# Patient Record
Sex: MALE | Race: WHITE | Employment: OTHER | URBAN - NONMETROPOLITAN AREA
[De-identification: names, ages, dates, MRNs, and addresses within clinical notes are randomized per-mention and may not be internally consistent; named-entity substitution may affect disease eponyms.]

---

## 2018-04-20 ENCOUNTER — ONCOLOGY VISIT (OUTPATIENT)
Dept: RADIATION ONCOLOGY | Facility: HOSPITAL | Age: 73
End: 2018-04-20
Attending: RADIOLOGY
Payer: MEDICARE

## 2018-04-20 VITALS
DIASTOLIC BLOOD PRESSURE: 82 MMHG | HEIGHT: 68 IN | BODY MASS INDEX: 31.37 KG/M2 | SYSTOLIC BLOOD PRESSURE: 176 MMHG | RESPIRATION RATE: 16 BRPM | WEIGHT: 207 LBS | HEART RATE: 52 BPM

## 2018-04-20 DIAGNOSIS — C61 PROSTATE CANCER (H): ICD-10-CM

## 2018-04-20 DIAGNOSIS — C61 MALIGNANT NEOPLASM OF PROSTATE (H): Primary | ICD-10-CM

## 2018-04-20 PROCEDURE — G0463 HOSPITAL OUTPT CLINIC VISIT: HCPCS | Performed by: RADIOLOGY

## 2018-04-20 RX ORDER — ATORVASTATIN CALCIUM 20 MG/1
20 TABLET, FILM COATED ORAL DAILY
COMMUNITY
Start: 2017-08-18 | End: 2020-10-26

## 2018-04-20 RX ORDER — METOPROLOL SUCCINATE 25 MG/1
25 TABLET, EXTENDED RELEASE ORAL DAILY
COMMUNITY
Start: 2017-08-18

## 2018-04-20 RX ORDER — AMOXICILLIN 500 MG
1200 CAPSULE ORAL DAILY
COMMUNITY
Start: 2013-03-05

## 2018-04-20 RX ORDER — ASPIRIN 325 MG
325 TABLET ORAL DAILY
COMMUNITY
Start: 2010-08-12

## 2018-04-20 ASSESSMENT — PAIN SCALES - GENERAL: PAINLEVEL: NO PAIN (0)

## 2018-04-20 NOTE — PATIENT INSTRUCTIONS
One week before your simulation begin avoiding food that you know causes you gas.    Stay well hydrated throughout treatment.    Two days before your sim start taking Gas-x (Simethicone 125 mg) three times per day, after meals.  Start on Saturday 4/28/18     Continue taking Gas-X every day until you are finished with your radiation treatments. (Including weekends).    Do your best to have a bowel movement before every appointment    On the day of your simulation:      Do not eat or drink anything for 2 hours before your appointment.  Stop eating/drinking at 11:15 am.      Empty bladder and drink 12 ounces of water at 12:30 pm.  DO NOT empty bladder again until after your appointment.    Continue this throughout treatment.

## 2018-04-20 NOTE — CONSULTS
Consult Date:  04/20/2018      DIAGNOSIS:  Prostate carcinoma, Dorys 3+3, however, with marked acceleration in PSA history which is currently 9.95.        HISTORY:  The patient has had his PSA followed for several years prior to urologic evaluation.  It had remained very low, less than 1 from 2489-0000, taking approximately 5-6 years to rise up to 2.77.  It then almost doubled in 2 years and now, while it still has a doubling interval of approximately 4-5 years, appears to be going up exponentially or what is called an increasing velocity.  He has been, again, on active surveillance with a score of Dorys 3+3, however, is not a gentleman who is terribly skeptical of treatment or wants to avoid treatment.      PAST MEDICAL HISTORY:  Some coronary history having had triple bypass, although with excellent recovery and outcome.  He had a left knee osteotomy, of course many years ago, 1980, which has worked out extremely well for him, avoiding total knee arthroplasty.  He has had arthroscopic surgery of his knees, right cataract surgery.      INTERCURRENT MEDICAL ILLNESSES:  Dyslipidemia, history of coronary artery disease with a good surgical outcome, hypertension, and history of nonmelanoma skin carcinoma.  He is not diabetic.  No significant history other than cardiac of any vascular disease, specifically no CNS vascular disease or claudication.      ALLERGIES:  LISINOPRIL.      REVIEW OF SYSTEMS:  No diplopia, headaches, dizziness, loss of consciousness.  No swallowing difficulties, odynophagia, or dysphagia.  No thermal or temperature intolerance.  No nausea, vomiting, heartburn.  No orthopnea, dyspnea, palpitations.  No abdominal complaints, constipation, bloating, diarrhea.  No fevers, chills, night sweats.  Urinary tract function is excellent.  Rare nocturia.        DIET:  General.        WEIGHT:  Stable.      FAMILY HISTORY:  Maternal grandfather had thyroid carcinoma.      HABITS:  Alcohol use, minimal.   Tobacco, distant history.  Very little tobacco use at all.      SOCIAL AND DEMOGRAPHIC:  The patient is single and has three adult offspring.  He is a retired , lives at home with his granddaughter.      PHYSICAL EXAMINATION:   GENERAL:  Reveals a cooperative, healthy-appearing male.   VITAL SIGNS:  Weight is 207.8 pounds, blood pressure 176/82, heart rate 52.   HEENT:  Extraocular movements full.  Pupils are equal, round, reactive.  Face, symmetric.  Palate and tongue, midline and symmetric.   NECK:  No cervical or supraclavicular lymphadenopathy.   LUNGS:  Clear to auscultation.   HEART:  Cardiac exam reveals regular rate and rhythm without murmurs or extra sounds.   CHEST:  No axillary lymphadenopathy.   ABDOMEN:  Nontender, without appreciable organomegaly.  No inguinal lymphadenopathy.  No axial skeletal tenderness.   EXTREMITIES:  Extremity strength is intact and symmetric and deep tendon reflexes as well.     IMAGING:  A report of a negative bone scan is on the chart and I do not believe he has had any other imaging which, of course, is appropriate.      IMPRESSION:  Low-grade Dorys 3+3 carcinoma by history; however, I think his PSA dynamics are concerning, particularly if one looks at a graph of the PSA it appears to be a very brisk upward slope.  For this reason, I think it would be very reasonable to offer him definitive treatment.  We had a full discussion about this, that the primary concern with prostate carcinoma is local control.  Certainly, his distant metastatic risk I think is quite low.  I described a course of definitive radiation, approximately 40 treatments in all, with side effects such as bladder or rectal irritation, increased urinary frequency, symptoms of cystitis, proctitis, diarrhea, rare late complications, ulceration or fistula of the bladder or rectum resulting in loss of these organs, probably 2% or less.  The patient understands very well the issues, accepts the  risks and complications, and does desire treatment.        PLAN:  With that in mind we went ahead and scheduled treatment simulation and planning, planning CT and also MRI scan to help delineate anatomy.  He was also given bladder and bowel instructions as well.         NOEL TAPIA MD             D: 2018   T: 2018   MT: NTS      Name:     ROLDAN MARQUES   MRN:      [id number]        Account:       [id number]   :      1945           Consult Date:  2018      Document: [id number]       cc: Sabina Matthews DO       Noel Tapia MD

## 2018-04-20 NOTE — PROGRESS NOTES
"INITIAL PATIENT ASSESSMENT    Referring Physician: Cassie  Other Physicians: Sabina Flanagan NP    Diagnosis: prostate cancer       Chief Complaint   Patient presents with     Radiation Therapy       Initial /82 (BP Location: Right arm, Patient Position: Chair, Cuff Size: Adult Regular)  Pulse 52  Resp 16  Ht 1.727 m (5' 8\")  Wt 93.9 kg (207 lb)  BMI 31.47 kg/m2 Estimated body mass index is 31.47 kg/(m^2) as calculated from the following:    Height as of this encounter: 1.727 m (5' 8\").    Weight as of this encounter: 93.9 kg (207 lb).  Medication Reconciliation: complete      Prior radiation therapy: None    Prior chemotherapy: None    Prior hormonal therapy:No    Pain Eval:  Denies    Psychosocial  Marital Status:  single  Spouse/Significant other: NA   Children: 3   Occupation:     Retired: Yes  Living arrangements: lives with granddaughter  Do you feel safe at home? Yes  Activity status: independent   referral needs: Not needed    Advanced Directive: No    Patient was assessed for the influenza, pneumo-poly, prevnar 13, Tdap, and shingles immunizations. \"Vaccinations and Cancer Treatment\" flyer given.  Instructed patient to discuss vaccination status with his/her PCM.     Pt is here today for a consult for radiation therapy for prostate cancer.  Educated patient on the mapping process and the possible side effects of XRT to the pelvis, to include: skin irritation, urinary changes, diarrhea, and fatigue.  Given folder with written information and prostate simulation instructions. Pt verbalizes an understanding and has no questions at this time. Pt is accompanied by son today.      Lesley Siddiqui RN          "

## 2018-04-20 NOTE — MR AVS SNAPSHOT
After Visit Summary   4/20/2018    Steven Lopez    MRN: [id number]           Patient Information     Date Of Birth          1945        Visit Information        Provider Department      4/20/2018 10:00 AM Noel Ballesteros MD [facility]        Today's Diagnoses     Malignant neoplasm of prostate (H)    -  1    Prostate cancer (H)          Care Instructions      One week before your simulation begin avoiding food that you know causes you gas.    Stay well hydrated throughout treatment.    Two days before your sim start taking Gas-x (Simethicone 125 mg) three times per day, after meals.  Start on Saturday 4/28/18     Continue taking Gas-X every day until you are finished with your radiation treatments. (Including weekends).    Do your best to have a bowel movement before every appointment    On the day of your simulation:      Do not eat or drink anything for 2 hours before your appointment.  Stop eating/drinking at 11:15 am.      Empty bladder and drink 12 ounces of water at 12:30 pm.  DO NOT empty bladder again until after your appointment.    Continue this throughout treatment.              Follow-ups after your visit        Your next 10 appointments already scheduled     Apr 30, 2018  1:15 PM CDT   Simulation with HI SIMULATION   [facility] ([facility] )    [address] [address] [phone]            Apr 30, 2018  2:15 PM CDT   (Arrive by 2:00 PM)   MR PELVIS W/O CONTRAST with [address] MRI ([facility] )    [address] [address] [phone]           Take your medicines as usual, unless your doctor tells you not to. Bring a list of your current medicines to your exam (including vitamins, minerals and over-the-counter drugs). Also bring the results of similar scans you may have had.  Please remove any body piercings and hair extensions before you arrive.  Follow your doctor s orders. If you do  not, we may have to postpone your exam.  You may or may not receive IV contrast for this exam pending the discretion of the Radiologist.  You do not need to do anything special to prepare.  The MRI machine uses a strong magnet. Please wear clothes without metal (snaps, zippers). A sweatsuit works well, or we may give you a hospital gown.   **IMPORTANT** THE INSTRUCTIONS BELOW ARE ONLY FOR THOSE PATIENTS WHO HAVE BEEN PRESCRIBED SEDATION OR GENERAL ANESTHESIA DURING THEIR MRI PROCEDURE:  IF YOUR DOCTOR PRESCRIBED ORAL SEDATION (take medicine to help you relax during your exam):   You must get the medicine from your doctor (oral medication) before you arrive. Bring the medicine to the exam. Do not take it at home. You ll be told when to take it upon arriving for your exam.   Arrive one hour early. Bring someone who can take you home after the test. Your medicine will make you sleepy. After the exam, you may not drive, take a bus or take a taxi by yourself.  IF YOUR DOCTOR PRESCRIBED IV SEDATION:   Arrive one hour early. Bring someone who can take you home after the test. Your medicine will make you sleepy. After the exam, you may not drive, take a bus or take a taxi by yourself.   No eating 6 hours before your exam. You may have clear liquids up until 4 hours before your exam. (Clear liquids include water, clear tea, black coffee and fruit juice without pulp.)  IF YOUR DOCTOR PRESCRIBED ANESTHESIA (be asleep for your exam):   Arrive 1 1/2 hours early. Bring someone who can take you home after the test. You may not drive, take a bus or take a taxi by yourself.   No eating 8 hours before your exam. You may have clear liquids up until 4 hours before your exam. (Clear liquids include water, clear tea, black coffee and fruit juice without pulp.)   You will spend four to five hours in the recovery room.  Please call the Imaging Department at your exam site with any questions.              Future tests that were ordered for  "you today     Open Future Orders        Priority Expected Expires Ordered    MR Pelvis w/o Contrast Routine  2019            Who to contact     If you have questions or need follow up information about today's clinic visit or your schedule please contact [facility] directly at [phone].  Normal or non-critical lab and imaging results will be communicated to you by MyChart, letter or phone within 4 business days after the clinic has received the results. If you do not hear from us within 7 days, please contact the clinic through [employer] or phone. If you have a critical or abnormal lab result, we will notify you by phone as soon as possible.  Submit refill requests through [employer] or call your pharmacy and they will forward the refill request to us. Please allow 3 business days for your refill to be completed.          Additional Information About Your Visit        MyChart Information     [employer] lets you send messages to your doctor, view your test results, renew your prescriptions, schedule appointments and more. To sign up, go to [email or web address] . Click on \"Log in\" on the left side of the screen, which will take you to the Welcome page. Then click on \"Sign up Now\" on the right side of the page.     You will be asked to enter the access code listed below, as well as some personal information. Please follow the directions to create your username and password.     Your access code is: [email or web address]  Expires: 2018  2:55 PM     Your access code will  in 90 days. If you need help or a new code, please call your [place] clinic or [phone].        Care EveryWhere ID     This is your Care EveryWhere ID. This could be used by other organizations to access your [place] medical records  [id number]        Your Vitals Were     Pulse Respirations Height BMI (Body Mass Index)          52 16 1.727 m (5' 8\") 31.47 kg/m2         Blood Pressure from Last 3 Encounters:   18 " 176/82    Weight from Last 3 Encounters:   04/20/18 93.9 kg (207 lb)              We Performed the Following     Full Code        Primary Care Provider Office Phone # Fax #    Sabina MachadoKENDAL yung [phone]       [place] [address] [address] [facility] [address]        Equal Access to Services     [facility]: Hadii aad ku hadasho Soomaali, waaxda luqadaha, qaybta kaalmada adeegyada, waxay idiin hayaan adeeg khtonysh la'aan . So [facility] [phone].    ATENCIÓN: Si habla español, tiene a bergman disposición servicios gratuitos de asistencia lingüística. [facility] [phone].    We comply with applicable federal civil rights laws and [place] laws. We do not discriminate on the basis of race, color, national origin, age, disability, sex, sexual orientation, or gender identity.            Thank you!     Thank you for choosing [facility]  for your care. Our goal is always to provide you with excellent care. Hearing back from our patients is one way we can continue to improve our services. Please take a few minutes to complete the written survey that you may receive in the mail after your visit with us. Thank you!             Your Updated Medication List - Protect others around you: Learn how to safely use, store and throw away your medicines at www.disposemymeds.org.          This list is accurate as of 4/20/18  2:55 PM.  Always use your most recent med list.                   Brand Name Dispense Instructions for use Diagnosis    aspirin 325 MG tablet      Take 325 mg by mouth daily        atorvastatin 20 MG tablet    LIPITOR     Take 20 mg by mouth daily        fish Oil 1200 MG capsule      Take 1,200 mg by mouth daily        metoprolol succinate 25 MG 24 hr tablet    TOPROL-XL     Take 25 mg by mouth daily

## 2018-04-21 ASSESSMENT — PATIENT HEALTH QUESTIONNAIRE - PHQ9: SUM OF ALL RESPONSES TO PHQ QUESTIONS 1-9: 1

## 2018-04-30 ENCOUNTER — HOSPITAL ENCOUNTER (OUTPATIENT)
Dept: MRI IMAGING | Facility: HOSPITAL | Age: 73
Setting detail: RADIATION/ONCOLOGY SERIES
End: 2018-04-30
Attending: RADIOLOGY
Payer: MEDICARE

## 2018-04-30 ENCOUNTER — ALLIED HEALTH/NURSE VISIT (OUTPATIENT)
Dept: RADIATION ONCOLOGY | Facility: HOSPITAL | Age: 73
End: 2018-04-30
Attending: RADIOLOGY
Payer: MEDICARE

## 2018-04-30 DIAGNOSIS — C61 MALIGNANT NEOPLASM OF PROSTATE (H): Primary | ICD-10-CM

## 2018-04-30 DIAGNOSIS — C61 MALIGNANT NEOPLASM OF PROSTATE (H): ICD-10-CM

## 2018-04-30 PROCEDURE — 72195 MRI PELVIS W/O DYE: CPT | Mod: TC

## 2018-04-30 PROCEDURE — 77334 RADIATION TREATMENT AID(S): CPT | Performed by: RADIOLOGY

## 2018-04-30 NOTE — MR AVS SNAPSHOT
After Visit Summary   4/30/2018    Steven Lopez    MRN: [id number]           Patient Information     Date Of Birth          1945        Visit Information        Provider Department      4/30/2018 1:15 PM HI SIMULATION [facility]        Today's Diagnoses     Malignant neoplasm of prostate (H)    -  1       Follow-ups after your visit        Your next 10 appointments already scheduled     Apr 30, 2018  2:15 PM CDT   (Arrive by 2:00 PM)   MR PELVIS W/O CONTRAST with HIMR1   HI MRI ([facility] )    [address] [address] [phone]           Take your medicines as usual, unless your doctor tells you not to. Bring a list of your current medicines to your exam (including vitamins, minerals and over-the-counter drugs). Also bring the results of similar scans you may have had.  Please remove any body piercings and hair extensions before you arrive.  Follow your doctor s orders. If you do not, we may have to postpone your exam.  You may or may not receive IV contrast for this exam pending the discretion of the Radiologist.  You do not need to do anything special to prepare.  The MRI machine uses a strong magnet. Please wear clothes without metal (snaps, zippers). A sweatsuit works well, or we may give you a hospital gown.   **IMPORTANT** THE INSTRUCTIONS BELOW ARE ONLY FOR THOSE PATIENTS WHO HAVE BEEN PRESCRIBED SEDATION OR GENERAL ANESTHESIA DURING THEIR MRI PROCEDURE:  IF YOUR DOCTOR PRESCRIBED ORAL SEDATION (take medicine to help you relax during your exam):   You must get the medicine from your doctor (oral medication) before you arrive. Bring the medicine to the exam. Do not take it at home. You ll be told when to take it upon arriving for your exam.   Arrive one hour early. Bring someone who can take you home after the test. Your medicine will make you sleepy. After the exam, you may not drive, take a bus or take a taxi by yourself.  IF YOUR DOCTOR  PRESCRIBED IV SEDATION:   Arrive one hour early. Bring someone who can take you home after the test. Your medicine will make you sleepy. After the exam, you may not drive, take a bus or take a taxi by yourself.   No eating 6 hours before your exam. You may have clear liquids up until 4 hours before your exam. (Clear liquids include water, clear tea, black coffee and fruit juice without pulp.)  IF YOUR DOCTOR PRESCRIBED ANESTHESIA (be asleep for your exam):   Arrive 1 1/2 hours early. Bring someone who can take you home after the test. You may not drive, take a bus or take a taxi by yourself.   No eating 8 hours before your exam. You may have clear liquids up until 4 hours before your exam. (Clear liquids include water, clear tea, black coffee and fruit juice without pulp.)   You will spend four to five hours in the recovery room.  Please call the Imaging Department at your exam site with any questions.            May 09, 2018  2:45 PM CDT   Treatment with HI CLINAC IX   [facility] ([facility] )    [address] [address]   [phone]            May 10, 2018  1:15 PM CDT   Treatment with HI CLINAC IX   [facility] ([facility] )    [address] [address]   [phone]            May 11, 2018  1:15 PM CDT   Treatment with HI CLINAC IX   [facility] ([facility] )    [address] [address]   [phone]            May 14, 2018  1:15 PM CDT   Treatment with HI CLINAC IX   [facility] ([facility] )    [address] [address]   [phone]            May 15, 2018  1:15 PM CDT   Treatment with HI CLINAC IX   [facility] ([facility] )    [address] [address]   [phone]            May 16, 2018  1:15 PM CDT   Treatment with HI CLINAC IX   [facility] ([facility] )    [address]  "[facility] [id number] [phone]            May 17, 2018  1:15 PM CDT   Treatment with HI CLINAC IX   [facility] Radiation [facility]cology ([facility] )    [address] [address]   [phone]            May 18, 2018  1:15 PM CDT   Treatment with HI CLINAC IX   [facility] ([facility] )    [address] [id number] [phone]            May 21, 2018  1:15 PM CDT   Treatment with HI CLINAC IX   [facility] ([facility] )    [address] [address]   [phone]              Who to contact     If you have questions or need follow up information about today's clinic visit or your schedule please contact [facility] directly at [phone].  Normal or non-critical lab and imaging results will be communicated to you by [employer], letter or phone within 4 business days after the clinic has received the results. If you do not hear from us within 7 days, please contact the clinic through [employer] or phone. If you have a critical or abnormal lab result, we will notify you by phone as soon as possible.  Submit refill requests through [employer] or call your pharmacy and they will forward the refill request to us. Please allow 3 business days for your refill to be completed.          Additional Information About Your Visit        [employer] Information     [employer] lets you send messages to your doctor, view your test results, renew your prescriptions, schedule appointments and more. To sign up, go to [email or web address] . Click on \"Log in\" on the left side of the screen, which will take you to the Welcome page. Then click on \"Sign up Now\" on the right side of the page.     You will be asked to enter the access code listed below, as well as some personal information. Please follow the directions to create your username and password.     Your access code is: [email or web address]  Expires: 7/19/2018  2:55 PM     Your " access code will  in 90 days. If you need help or a new code, please call your [place] clinic or [phone].        Care EveryWhere ID     This is your Care EveryWhere ID. This could be used by other organizations to access your [place] medical records  [id number]         Blood Pressure from Last 3 Encounters:   18 176/82    Weight from Last 3 Encounters:   18 93.9 kg (207 lb)              Today, you had the following     No orders found for display       Primary Care Provider Office Phone # Fax #    Sabina Flaangan, [phone]       [place] [address] [address]  [facility] [address]        Equal Access to Services     [facility] : Hadii charlie welsho Soomaali, waaxda luqadaha, qaybta kaalmada adeegyada, steven victoria . So [facility] [place] [phone].    ATENCIÓN: Si habla español, tiene a bergman disposición servicios gratuitos de asistencia lingüística. Llame al [phone].    We comply with applicable federal civil rights laws and [place] laws. We do not discriminate on the basis of race, color, national origin, age, disability, sex, sexual orientation, or gender identity.            Thank you!     Thank you for choosing [facility]  for your care. Our goal is always to provide you with excellent care. Hearing back from our patients is one way we can continue to improve our services. Please take a few minutes to complete the written survey that you may receive in the mail after your visit with us. Thank you!             Your Updated Medication List - Protect others around you: Learn how to safely use, store and throw away your medicines at www.disposemymeds.org.          This list is accurate as of 18  1:52 PM.  Always use your most recent med list.                   Brand Name Dispense Instructions for use Diagnosis    aspirin 325 MG tablet      Take 325 mg by mouth daily        atorvastatin 20 MG tablet    LIPITOR     Take  20 mg by mouth daily        fish Oil 1200 MG capsule      Take 1,200 mg by mouth daily        metoprolol succinate 25 MG 24 hr tablet    TOPROL-XL     Take 25 mg by mouth daily

## 2018-05-08 PROCEDURE — 77338 DESIGN MLC DEVICE FOR IMRT: CPT | Performed by: RADIOLOGY

## 2018-05-08 PROCEDURE — 77300 RADIATION THERAPY DOSE PLAN: CPT | Performed by: RADIOLOGY

## 2018-05-09 ENCOUNTER — ALLIED HEALTH/NURSE VISIT (OUTPATIENT)
Dept: RADIATION ONCOLOGY | Facility: HOSPITAL | Age: 73
End: 2018-05-09
Attending: RADIOLOGY
Payer: MEDICARE

## 2018-05-09 DIAGNOSIS — C61 MALIGNANT NEOPLASM OF PROSTATE (H): Primary | ICD-10-CM

## 2018-05-09 PROCEDURE — 77385 ZZH IMRT TREATMENT DELIVERY, SIMPLE: CPT | Performed by: RADIOLOGY

## 2018-05-09 PROCEDURE — 77301 RADIOTHERAPY DOSE PLAN IMRT: CPT | Performed by: RADIOLOGY

## 2018-05-09 NOTE — MR AVS SNAPSHOT
After Visit Summary   5/9/2018    Steven Lopez    MRN: [id number]           Patient Information     Date Of Birth          1945        Visit Information        Provider Department      5/9/2018 2:45 PM [facility]        Today's Diagnoses     Malignant neoplasm of prostate (H)    -  1       Follow-ups after your visit        Your next 10 appointments already scheduled     May 10, 2018  1:15 PM CDT   Treatment with [facility]   [facility] Rad[facility] Oncology ([facility] )    [address] [id number] [phone]            May 11, 2018  1:15 PM CDT   Treatment with [facility]   [facility] Rad[facility]cology ([facility] )    [address] [id number] [phone]            May 14, 2018  1:15 PM CDT   Treatment with [facility]   [facility] Rad[facility] Oncology ([facility] )    [address] [id number] [phone]            May 15, 2018  1:15 PM CDT   Treatment with [facility]   [facility] Rad[facility] Oncology ([facility] )    [address] [phone] [phone]            May 16, 2018  1:15 PM CDT   Treatment with [facility]   [facility] Radiation Oncology ([facility] )    [address] [address] [phone]            May 17, 2018  1:15 PM CDT   Treatment with [facility]   [facility] Radiation Oncology ([facility] )    [address] [address] [phone]            May 18, 2018  1:15 PM CDT   Treatment with [facility]   [facility] Radiation Oncology ([facility] )    [address] [id number] [phone]            May 21, 2018  1:15 PM CDT   Treatment with [facility]   [facility] Radiation Oncology ([facility] )    [address] [phone]            May 22, 2018  1:15 PM CDT   Treatment with [facility]   HI Radiation Oncology ([facility] )    [address] [address] [phone]            May 23, 2018  1:15 PM CDT   Treatment with [facility]   [facility] Radiation Oncology ([facility] )    [address] [address] [phone]              Who to contact     If you have questions or need follow up information about today's clinic visit or your schedule please contact [facility] directly at [phone].  Normal or non-critical lab and imaging results will be communicated to you by MyChart, letter or phone within 4 business days after the clinic has received the results. If you do not hear from us within 7 days, please contact the clinic through [employer] or phone. If you have a critical or abnormal lab result, we will notify you by phone as soon as possible.  Submit refill requests through [employer] or call your pharmacy and they will forward the refill request to us. Please allow 3 business days for your refill to be completed.          Additional Information About Your Visit        [employer] Information     [employer] lets you send messages to your doctor, view your test results, renew your prescriptions, schedule appointments and more. To sign up, go to [email or web address] . Click on \"Log in\" on the left side of the screen, which will take you to the Welcome page. Then click on \"Sign up Now\" on the right side of the page.     You will be asked to enter the access code listed below, as well as some personal information. Please follow the directions to create your username and password.     Your access code is: [email or web address]  Expires: 2018  2:55 PM     Your access code will  in 90 days. If you need help or a new code, please call your [place] clinic or [phone].        Care EveryWhere ID     This is your Care EveryWhere ID. This could be used by other organizations to access your [place] medical records  [id number]         Blood Pressure from Last 3 Encounters: "   04/20/18 176/82    Weight from Last 3 Encounters:   04/20/18 93.9 kg (207 lb)              Today, you had the following     No orders found for display       Primary Care Provider Office Phone # Fax #    Sabina KENDAL Flanagan [phone]       [place] [address] ARTI WALSH [facility] [address]        Equal Access to Services     [facility]: Hadii aad ku hadasho Soomaali, waaxda luqadaha, qaybta kaalmada adeegyada, waxay idiin hayaan adeeg khtonysh lajimn . So [facility] [phone].    ATENCIÓN: Si habla español, tiene a bergman disposición servicios gratuitos de asistencia lingüística. Audreyame al [phone].    We comply with applicable federal civil rights laws and [place] laws. We do not discriminate on the basis of race, color, national origin, age, disability, sex, sexual orientation, or gender identity.            Thank you!     Thank you for choosing [facility]  for your care. Our goal is always to provide you with excellent care. Hearing back from our patients is one way we can continue to improve our services. Please take a few minutes to complete the written survey that you may receive in the mail after your visit with us. Thank you!             Your Updated Medication List - Protect others around you: Learn how to safely use, store and throw away your medicines at www.disposemymeds.org.          This list is accurate as of 5/9/18  3:10 PM.  Always use your most recent med list.                   Brand Name Dispense Instructions for use Diagnosis    aspirin 325 MG tablet      Take 325 mg by mouth daily        atorvastatin 20 MG tablet    LIPITOR     Take 20 mg by mouth daily        fish Oil 1200 MG capsule      Take 1,200 mg by mouth daily        metoprolol succinate 25 MG 24 hr tablet    TOPROL-XL     Take 25 mg by mouth daily

## 2018-05-09 NOTE — PROGRESS NOTES
Steven Lopez received radiation therapy treatment today 05/09/18.    Kelly Pantoja  May 9, 2018  2:36 PM

## 2018-05-10 ENCOUNTER — ALLIED HEALTH/NURSE VISIT (OUTPATIENT)
Dept: RADIATION ONCOLOGY | Facility: HOSPITAL | Age: 73
End: 2018-05-10

## 2018-05-10 DIAGNOSIS — C61 MALIGNANT NEOPLASM OF PROSTATE (H): Primary | ICD-10-CM

## 2018-05-10 PROCEDURE — 77385 ZZH IMRT TREATMENT DELIVERY, SIMPLE: CPT | Performed by: RADIOLOGY

## 2018-05-10 NOTE — MR AVS SNAPSHOT
After Visit Summary   5/10/2018    Steven Lopez    MRN: [id number]           Patient Information     Date Of Birth          1945        Visit Information        Provider Department      5/10/2018 1:15 PM [facility]        Today's Diagnoses     Malignant neoplasm of prostate (H)    -  1       Follow-ups after your visit        Your next 10 appointments already scheduled     May 11, 2018  1:15 PM CDT   Treatment with [facility]   [facility] Radiation Oncology ([facility] )    [address] [id number] [phone]            May 14, 2018  1:15 PM CDT   Treatment with [facility]   [facility] Radiation Oncology ([facility] )    [address] [address] [phone]            May 15, 2018  1:15 PM CDT   Treatment with [facility]   [facility] Radiation Oncology ([facility] )    [address] [id number] [phone]            May 16, 2018  1:15 PM CDT   Treatment with [facility]   [facility] Radiation Oncology ([facility] )    [address] [address] [phone]            May 17, 2018  1:15 PM CDT   Treatment with [facility]   [facility] Radiation Oncology ([facility] )    [address] [address] [phone]            May 18, 2018  1:15 PM CDT   Treatment with [facility]   [facility] Radiation Oncology ([facility] )    [address] [address] [phone]            May 21, 2018  1:15 PM CDT   Treatment with [facility]   [facility] Radiation Oncology ([facility] )    [address] [id number] [phone]            May 22, 2018  1:15 PM CDT   Treatment with [facility]   [facility] Radiation Oncology ([facility] )    [address] [address] [phone]            May 23, 2018  1:15 PM CDT   Treatment with [facility]   HI Radiation Oncology ([facility]  "[facility] )    [address] [address] [phone]            May 24, 2018  1:15 PM CDT   Treatment with [facility]   HI Radiation Oncology ([facility] )    [address] [address] [phone]              Who to contact     If you have questions or need follow up information about today's clinic visit or your schedule please contact [facility] directly at [phone].  Normal or non-critical lab and imaging results will be communicated to you by MyChart, letter or phone within 4 business days after the clinic has received the results. If you do not hear from us within 7 days, please contact the clinic through [employer] or phone. If you have a critical or abnormal lab result, we will notify you by phone as soon as possible.  Submit refill requests through [employer] or call your pharmacy and they will forward the refill request to us. Please allow 3 business days for your refill to be completed.          Additional Information About Your Visit        [employer] Information     [employer] lets you send messages to your doctor, view your test results, renew your prescriptions, schedule appointments and more. To sign up, go to [email or web address] . Click on \"Log in\" on the left side of the screen, which will take you to the Welcome page. Then click on \"Sign up Now\" on the right side of the page.     You will be asked to enter the access code listed below, as well as some personal information. Please follow the directions to create your username and password.     Your access code is: [email or web address]  Expires: 2018  2:55 PM     Your access code will  in 90 days. If you need help or a new code, please call your [place] clinic or [phone].        Care EveryWhere ID     This is your Care EveryWhere ID. This could be used by other organizations to access your [place] medical records  [id number]         Blood Pressure from Last 3 Encounters: "   04/20/18 176/82    Weight from Last 3 Encounters:   04/20/18 93.9 kg (207 lb)              Today, you had the following     No orders found for display       Primary Care Provider Office Phone # Fax #    Sabina KENDAL Flanagan [phone]       [place] [address] ARTI WALSH [facility] [address]        Equal Access to Services     [facility]: Hadii aad ku hadasho Soomaali, waaxda luqadaha, qaybta kaalmada adeegyada, waxay idiin hayaan adeeg khtonysh lajimn . So [facility] [phone].    ATENCIÓN: Si habla español, tiene a bergman disposición servicios gratuitos de asistencia lingüística. Audreyame al [phone].    We comply with applicable federal civil rights laws and [place] laws. We do not discriminate on the basis of race, color, national origin, age, disability, sex, sexual orientation, or gender identity.            Thank you!     Thank you for choosing [facility]  for your care. Our goal is always to provide you with excellent care. Hearing back from our patients is one way we can continue to improve our services. Please take a few minutes to complete the written survey that you may receive in the mail after your visit with us. Thank you!             Your Updated Medication List - Protect others around you: Learn how to safely use, store and throw away your medicines at www.disposemymeds.org.          This list is accurate as of 5/10/18  1:36 PM.  Always use your most recent med list.                   Brand Name Dispense Instructions for use Diagnosis    aspirin 325 MG tablet      Take 325 mg by mouth daily        atorvastatin 20 MG tablet    LIPITOR     Take 20 mg by mouth daily        fish Oil 1200 MG capsule      Take 1,200 mg by mouth daily        metoprolol succinate 25 MG 24 hr tablet    TOPROL-XL     Take 25 mg by mouth daily

## 2018-05-10 NOTE — PROGRESS NOTES
Steven Lopez received radiation therapy treatment today 05/10/18.    Trey Leyva  May 10, 2018  1:33 PM

## 2018-05-11 ENCOUNTER — ALLIED HEALTH/NURSE VISIT (OUTPATIENT)
Dept: RADIATION ONCOLOGY | Facility: HOSPITAL | Age: 73
End: 2018-05-11

## 2018-05-11 DIAGNOSIS — C61 MALIGNANT NEOPLASM OF PROSTATE (H): Primary | ICD-10-CM

## 2018-05-11 PROCEDURE — 77385 ZZH IMRT TREATMENT DELIVERY, SIMPLE: CPT | Performed by: RADIOLOGY

## 2018-05-11 NOTE — PROGRESS NOTES
Steven Lopez received radiation therapy treatment today 05/11/18.    Renetta Hughes  May 11, 2018  1:23 PM

## 2018-05-11 NOTE — MR AVS SNAPSHOT
After Visit Summary   5/11/2018    Steven Lopez    MRN: [id number]           Patient Information     Date Of Birth          1945        Visit Information        Provider Department      5/11/2018 1:15 PM [facility]        Today's Diagnoses     Malignant neoplasm of prostate (H)    -  1       Follow-ups after your visit        Your next 10 appointments already scheduled     May 14, 2018  1:15 PM CDT   Treatment with [facility]   [facility] Radiation Oncology ([facility] )    [address] [id number] [phone]            May 15, 2018  1:15 PM CDT   Treatment with [facility]   [facility] Radiation Oncology ([facility] )    [address] [id number] [phone]            May 16, 2018  1:15 PM CDT   Treatment with [facility]   [facility] Radiation Oncology ([facility] )    [address] [id number] [phone]            May 17, 2018  1:15 PM CDT   Treatment with [facility]   [facility] Radiation Oncology ([facility] )    [address] [id number] [phone]            May 18, 2018  1:15 PM CDT   Treatment with [facility]   [facility] Radiation Oncology ([facility] )    [address] [address] [phone]            May 21, 2018  1:15 PM CDT   Treatment with [facility]   [facility] Radiation Oncology ([facility] )    [address] [address] [phone]            May 22, 2018  1:15 PM CDT   Treatment with [facility]   [facility] Radiation Oncology ([facility] )    [address] [address] [phone]            May 23, 2018  1:15 PM CDT   Treatment with [facility]   [facility] Radiation Oncology ([facility] )    [address] [address] [phone]            May 24, 2018  1:15 PM CDT   Treatment with [facility] IX   HI Radiation Oncology ([facility] )    [address] [address] [phone]            May 25, 2018  1:15 PM CDT   Treatment with [facility] IX   HI Radiation Oncology ([facility] )    [address] [address] [phone]              Who to contact     If you have questions or need follow up information about today's clinic visit or your schedule please contact [facility] directly at [phone].  Normal or non-critical lab and imaging results will be communicated to you by MyChart, letter or phone within 4 business days after the clinic has received the results. If you do not hear from us within 7 days, please contact the clinic through [employer] or phone. If you have a critical or abnormal lab result, we will notify you by phone as soon as possible.  Submit refill requests through [employer] or call your pharmacy and they will forward the refill request to us. Please allow 3 business days for your refill to be completed.          Additional Information About Your Visit        [employer] Information     [employer] lets you send messages to your doctor, view your test results, renew your prescriptions, schedule appointments and more. To sign up, go to [email or web address] . Click on \"Log in\" on the left side of the screen, which will take you to the Welcome page. Then click on \"Sign up Now\" on the right side of the page.     You will be asked to enter the access code listed below, as well as some personal information. Please follow the directions to create your username and password.     Your access code is: [email or web address]  Expires: 2018  2:55 PM     Your access code will  in 90 days. If you need help or a new code, please call your [place] clinic or [phone].        Care EveryWhere ID     This is your Care EveryWhere ID. This could be used by other organizations to access your [place] medical records  [id number]         Blood Pressure from Last 3 Encounters: "   04/20/18 176/82    Weight from Last 3 Encounters:   04/20/18 93.9 kg (207 lb)              Today, you had the following     No orders found for display       Primary Care Provider Office Phone # Fax #    Sabina KENDAL Flanagan [phone]       [place] [address] ARTI WALSH [facility] [address]        Equal Access to Services     [facility]: Hadii aad ku hadasho Soomaali, waaxda luqadaha, qaybta kaalmada adeegyada, waxay idiin hayaan adeeg khtonysh lajimn . So [facility] [place] [phone].    ATENCIÓN: Si habla español, tiene a bergman disposición servicios gratuitos de asistencia lingüística. Audreyame al [phone].    We comply with applicable federal civil rights laws and [place] laws. We do not discriminate on the basis of race, color, national origin, age, disability, sex, sexual orientation, or gender identity.            Thank you!     Thank you for choosing [facility]  for your care. Our goal is always to provide you with excellent care. Hearing back from our patients is one way we can continue to improve our services. Please take a few minutes to complete the written survey that you may receive in the mail after your visit with us. Thank you!             Your Updated Medication List - Protect others around you: Learn how to safely use, store and throw away your medicines at www.disposemymeds.org.          This list is accurate as of 5/11/18  1:29 PM.  Always use your most recent med list.                   Brand Name Dispense Instructions for use Diagnosis    aspirin 325 MG tablet      Take 325 mg by mouth daily        atorvastatin 20 MG tablet    LIPITOR     Take 20 mg by mouth daily        fish Oil 1200 MG capsule      Take 1,200 mg by mouth daily        metoprolol succinate 25 MG 24 hr tablet    TOPROL-XL     Take 25 mg by mouth daily

## 2018-05-14 ENCOUNTER — ALLIED HEALTH/NURSE VISIT (OUTPATIENT)
Dept: RADIATION ONCOLOGY | Facility: HOSPITAL | Age: 73
End: 2018-05-14

## 2018-05-14 DIAGNOSIS — C61 MALIGNANT NEOPLASM OF PROSTATE (H): Primary | ICD-10-CM

## 2018-05-14 PROCEDURE — 77385 ZZH IMRT TREATMENT DELIVERY, SIMPLE: CPT | Performed by: RADIOLOGY

## 2018-05-14 NOTE — PROGRESS NOTES
Steven Lopez received radiation therapy treatment today 05/14/18.    Renetta Hughes  May 14, 2018  1:25 PM

## 2018-05-14 NOTE — MR AVS SNAPSHOT
After Visit Summary   5/14/2018    Steven Lopez    MRN: [id number]           Patient Information     Date Of Birth          1945        Visit Information        Provider Department      5/14/2018 1:15 PM [facility] Rad[facility]        Today's Diagnoses     Malignant neoplasm of prostate (H)    -  1       Follow-ups after your visit        Your next 10 appointments already scheduled     May 15, 2018  1:15 PM CDT   Treatment with [facility] CL[facility] IX   HI Radiation Oncology ([facility] )    [address] [phone]            May 16, 2018  1:15 PM CDT   Treatment with [facility] CL[facility] IX   HI Radiation Oncology ([facility] )    [address] [phone] [phone]            May 17, 2018  1:15 PM CDT   Treatment with [facility] CL[facility] IX   [facility] Radiation Oncology ([facility] )    [address] [address] [phone]            May 18, 2018  1:15 PM CDT   Treatment with [facility] CL[facility] IX   [facility] Radiation Oncology ([facility] )    [address] [address] [phone]            May 21, 2018  1:15 PM CDT   Treatment with [facility] CL[facility] IX   [facility] Radiation Oncology ([facility] )    [address] [address] [phone]            May 22, 2018  1:15 PM CDT   Treatment with HI CLIN[facility] IX   HI Radiation Oncology ([facility] )    [address] [address] [phone]            May 23, 2018  1:15 PM CDT   Treatment with HI CLIN[facility] IX   HI Radiation Oncology ([facility] )    [address] [id number] [phone]            May 24, 2018  1:15 PM CDT   Treatment with HI CL[facility] IX   [facility] Radiation Oncology ([facility] )    [address] [address] [phone]            May 25, 2018  1:15 PM CDT   Treatment with HI CLIN[facility] IX   HI Radiation Oncology ([facility]  "[facility] )    [address] [address] [phone]            May 29, 2018  1:15 PM CDT   Treatment with HI CL[facility] IX   HI Radiation Oncology ([facility] )    [address] [address] [phone]              Who to contact     If you have questions or need follow up information about today's clinic visit or your schedule please contact [facility] directly at [phone].  Normal or non-critical lab and imaging results will be communicated to you by MyChart, letter or phone within 4 business days after the clinic has received the results. If you do not hear from us within 7 days, please contact the clinic through [employer] or phone. If you have a critical or abnormal lab result, we will notify you by phone as soon as possible.  Submit refill requests through [employer] or call your pharmacy and they will forward the refill request to us. Please allow 3 business days for your refill to be completed.          Additional Information About Your Visit        [employer] Information     [employer] lets you send messages to your doctor, view your test results, renew your prescriptions, schedule appointments and more. To sign up, go to [email or web address] . Click on \"Log in\" on the left side of the screen, which will take you to the Welcome page. Then click on \"Sign up Now\" on the right side of the page.     You will be asked to enter the access code listed below, as well as some personal information. Please follow the directions to create your username and password.     Your access code is: [email or web address]  Expires: 2018  2:55 PM     Your access code will  in 90 days. If you need help or a new code, please call your [place] clinic or [phone].        Care EveryWhere ID     This is your Care EveryWhere ID. This could be used by other organizations to access your [place] medical records  [id number]         Blood Pressure from Last 3 Encounters: "   04/20/18 176/82    Weight from Last 3 Encounters:   04/20/18 93.9 kg (207 lb)              Today, you had the following     No orders found for display       Primary Care Provider Office Phone # Fax #    Sabina KENDAL Flanagan [phone]       [place] [address] ARTI WALSH [facility] [address]        Equal Access to Services     [facility]: Hadii aad ku hadasho Soomaali, waaxda luqadaha, qaybta kaalmada adeegyada, waxay idiin hayaan adeeg khtonysh lajimn . So [facility] [phone].    ATENCIÓN: Si habla español, tiene a bergman disposición servicios gratuitos de asistencia lingüística. Audreyame al [phone].    We comply with applicable federal civil rights laws and [place] laws. We do not discriminate on the basis of race, color, national origin, age, disability, sex, sexual orientation, or gender identity.            Thank you!     Thank you for choosing [facility]  for your care. Our goal is always to provide you with excellent care. Hearing back from our patients is one way we can continue to improve our services. Please take a few minutes to complete the written survey that you may receive in the mail after your visit with us. Thank you!             Your Updated Medication List - Protect others around you: Learn how to safely use, store and throw away your medicines at www.disposemymeds.org.          This list is accurate as of 5/14/18  1:34 PM.  Always use your most recent med list.                   Brand Name Dispense Instructions for use Diagnosis    aspirin 325 MG tablet      Take 325 mg by mouth daily        atorvastatin 20 MG tablet    LIPITOR     Take 20 mg by mouth daily        fish Oil 1200 MG capsule      Take 1,200 mg by mouth daily        metoprolol succinate 25 MG 24 hr tablet    TOPROL-XL     Take 25 mg by mouth daily

## 2018-05-15 ENCOUNTER — ALLIED HEALTH/NURSE VISIT (OUTPATIENT)
Dept: RADIATION ONCOLOGY | Facility: HOSPITAL | Age: 73
End: 2018-05-15

## 2018-05-15 DIAGNOSIS — C61 MALIGNANT NEOPLASM OF PROSTATE (H): Primary | ICD-10-CM

## 2018-05-15 PROCEDURE — 77336 RADIATION PHYSICS CONSULT: CPT | Performed by: RADIOLOGY

## 2018-05-15 PROCEDURE — 77385 ZZH IMRT TREATMENT DELIVERY, SIMPLE: CPT | Performed by: RADIOLOGY

## 2018-05-15 NOTE — MR AVS SNAPSHOT
After Visit Summary   5/15/2018    Steven Lopez    MRN: [id number]           Patient Information     Date Of Birth          1945        Visit Information        Provider Department      5/15/2018 1:15 PM [facility]        Today's Diagnoses     Malignant neoplasm of prostate (H)    -  1       Follow-ups after your visit        Your next 10 appointments already scheduled     May 16, 2018  1:15 PM CDT   Treatment with [facility]   [facility] ([facility] )    [address] [id number] [phone]            May 16, 2018  1:30 PM CDT   on treatment visit with Noel Ballesteros MD   [facility] ([facility] )    [address] [id number] [phone]            May 17, 2018  1:15 PM CDT   [facility] with [facility]   [facility] ([facility] )    [address] [id number] [phone]            May 18, 2018  1:15 PM CDT   [facility] with [facility]   [facility] ([facility] )    [address] [address] [phone]            May 21, 2018  1:15 PM CDT   Treatment with [facility]   [facility] ([facility] )    [address] [id number] [phone]            May 22, 2018  1:15 PM CDT   Treatment with [facility]   [facility] ([facility] )    [address] [address] [phone]            May 23, 2018  1:15 PM CDT   Treatment with [facility]   [facility] ([facility] )    [address] [id number] [phone]            May 24, 2018  1:15 PM CDT   Treatment with [facility]   [facility] ([facility] )    [address] [address] [phone]            May 25, 2018  1:15 PM CDT   Treatment with [facility]   [facility] Radiation  "Oncology ([facility] )    [address] [address] [phone]            May 29, 2018  1:15 PM CDT   Treatment with [facility]   [facility]cology ([facility] )    [address] [address] [phone]              Who to contact     If you have questions or need follow up information about today's clinic visit or your schedule please contact [facility] directly at [phone].  Normal or non-critical lab and imaging results will be communicated to you by MyChart, letter or phone within 4 business days after the clinic has received the results. If you do not hear from us within 7 days, please contact the clinic through [employer] or phone. If you have a critical or abnormal lab result, we will notify you by phone as soon as possible.  Submit refill requests through [employer] or call your pharmacy and they will forward the refill request to us. Please allow 3 business days for your refill to be completed.          Additional Information About Your Visit        [employer] Information     [employer] lets you send messages to your doctor, view your test results, renew your prescriptions, schedule appointments and more. To sign up, go to [email or web address] . Click on \"Log in\" on the left side of the screen, which will take you to the Welcome page. Then click on \"Sign up Now\" on the right side of the page.     You will be asked to enter the access code listed below, as well as some personal information. Please follow the directions to create your username and password.     Your access code is: [email or web address]  Expires: 2018  2:55 PM     Your access code will  in 90 days. If you need help or a new code, please call your [place] clinic or [phone].        Care EveryWhere ID     This is your Care EveryWhere ID. This could be used by other organizations to access your [place] medical records  [id number]         Blood Pressure from " Last 3 Encounters:   04/20/18 176/82    Weight from Last 3 Encounters:   04/20/18 93.9 kg (207 lb)              Today, you had the following     No orders found for display       Primary Care Provider Office Phone # Fax #    Sabina KENDAL Flanagan [phone]       [place] [address] [address] [facility] [address]        Equal Access to Services     [facility]: Hadii aad ku hadasho Soomaali, waaxda luqadaha, qaybta kaalmada adeegyada, waxay idiin hayaan adeeg kharash la'aan ah. So [facility] [phone].    ATENCIÓN: Si habla español, tiene a bergman disposición servicios gratuitos de asistencia lingüística. Llame al [phone].    We comply with applicable federal civil rights laws and [place] laws. We do not discriminate on the basis of race, color, national origin, age, disability, sex, sexual orientation, or gender identity.            Thank you!     Thank you for choosing [facility]  for your care. Our goal is always to provide you with excellent care. Hearing back from our patients is one way we can continue to improve our services. Please take a few minutes to complete the written survey that you may receive in the mail after your visit with us. Thank you!             Your Updated Medication List - Protect others around you: Learn how to safely use, store and throw away your medicines at www.disposemymeds.org.          This list is accurate as of 5/15/18  1:32 PM.  Always use your most recent med list.                   Brand Name Dispense Instructions for use Diagnosis    aspirin 325 MG tablet      Take 325 mg by mouth daily        atorvastatin 20 MG tablet    LIPITOR     Take 20 mg by mouth daily        fish Oil 1200 MG capsule      Take 1,200 mg by mouth daily        metoprolol succinate 25 MG 24 hr tablet    TOPROL-XL     Take 25 mg by mouth daily

## 2018-05-15 NOTE — PROGRESS NOTES
Steven Lopez received radiation therapy treatment today 05/15/18.    Ever Lr  May 15, 2018  1:24 PM

## 2018-05-16 ENCOUNTER — ALLIED HEALTH/NURSE VISIT (OUTPATIENT)
Dept: RADIATION ONCOLOGY | Facility: HOSPITAL | Age: 73
End: 2018-05-16

## 2018-05-16 ENCOUNTER — OFFICE VISIT (OUTPATIENT)
Dept: RADIATION ONCOLOGY | Facility: HOSPITAL | Age: 73
End: 2018-05-16
Attending: RADIOLOGY
Payer: MEDICARE

## 2018-05-16 VITALS
RESPIRATION RATE: 16 BRPM | HEART RATE: 52 BPM | BODY MASS INDEX: 31.17 KG/M2 | WEIGHT: 205 LBS | DIASTOLIC BLOOD PRESSURE: 82 MMHG | SYSTOLIC BLOOD PRESSURE: 144 MMHG

## 2018-05-16 DIAGNOSIS — C61 MALIGNANT NEOPLASM OF PROSTATE (H): Primary | ICD-10-CM

## 2018-05-16 PROCEDURE — 77385 ZZH IMRT TREATMENT DELIVERY, SIMPLE: CPT | Performed by: RADIOLOGY

## 2018-05-16 NOTE — PROGRESS NOTES
Steven Lopez received radiation therapy treatment today 05/16/18.    Kelly Pantoja  May 16, 2018  1:26 PM

## 2018-05-16 NOTE — MR AVS SNAPSHOT
After Visit Summary   5/16/2018    Steven Lopez    MRN: [id number]           Patient Information     Date Of Birth          1945        Visit Information        Provider Department      5/16/2018 1:30 PM Noel Ballesteros MD [facility]        Today's Diagnoses     Malignant neoplasm of prostate (H)    -  1       Follow-ups after your visit        Your next 10 appointments already scheduled     May 17, 2018  1:15 PM CDT   Treatment with [facility]   [facility] ([facility] )    [address] [address]   [phone]            May 18, 2018  1:15 PM CDT   Treatment with [facility]   [facility] ([facility] )    [address] [address]   [phone]            May 21, 2018  1:15 PM CDT   Treatment with [facility]   [facility] ([facility] )    [address] [address]   [phone]            May 22, 2018  1:15 PM CDT   Treatment with [facility]   [facility] ([facility] )    [address] [address]   [phone]            May 23, 2018  1:15 PM CDT   Treatment with [facility]   [facility] ([facility] )    [address] [address]   [phone]            May 24, 2018  1:15 PM CDT   Treatment with [facility]   [facility] ([facility] )    [address] [address] [phone]            May 25, 2018  1:15 PM CDT   Treatment with [facility]   [facility] ([facility] )    [address] [address]   [phone]            May 29, 2018  1:15 PM CDT   Treatment with [facility]   [facility] ([facility] )    [address] [address]   [phone]            May 30, 2018  1:15 PM CDT   Treatment with [facility]   [facility] Radiation Oncology ([facility] )    [address] [address] [phone]            May 31, 2018  1:15 PM CDT   Treatment with [facility]   [facility] Radiation Oncology ([facility] )    [address] [address] [phone]              Who to contact     If you have questions or need follow up information about today's clinic visit or your schedule please contact [facility] directly at [phone].  Normal or non-critical lab and imaging results will be communicated to you by MyChart, letter or phone within 4 business days after the clinic has received the results. If you do not hear from us within 7 days, please contact the clinic through [employer] or phone. If you have a critical or abnormal lab result, we will notify you by phone as soon as possible.  Submit refill requests through [employer] or call your pharmacy and they will forward the refill request to us. Please allow 3 business days for your refill to be completed.          Additional Information About Your Visit        [employer] Information     [employer] lets you send messages to your doctor, view your test results, renew your prescriptions, schedule appointments and more. To sign up, go to [email or web address] . Click on \"Log in\" on the left side of the screen, which will take you to the Welcome page. Then click on \"Sign up Now\" on the right side of the page.     You will be asked to enter the access code listed below, as well as some personal information. Please follow the directions to create your username and password.     Your access code is: [email or web address]  Expires: 2018  2:55 PM     Your access code will  in 90 days. If you need help or a new code, please call your [place] clinic or [phone].        Care EveryWhere ID     This is your Care EveryWhere ID. This could be used by other organizations to access your [place] medical records  [id number]        Your Vitals Were     Pulse " Respirations BMI (Body Mass Index)             52 16 31.17 kg/m2          Blood Pressure from Last 3 Encounters:   05/16/18 144/82   04/20/18 176/82    Weight from Last 3 Encounters:   05/16/18 93 kg (205 lb)   04/20/18 93.9 kg (207 lb)              Today, you had the following     No orders found for display       Primary Care Provider Office Phone # Fax #    Sabina Flanagan, [phone]       [place] [address] [address] [facility] [address]        Equal Access to Services     [facility]: Hadii aad ge hadasho Soomaali, waaxda luqadaha, qaybta kaalmada adeegyada, steevn victoria . So [facility] [phone].    ATENCIÓN: Si habla español, tiene a bergman disposición servicios gratuitos de asistencia lingüística. [facility] [phone].    We comply with applicable federal civil rights laws and [place] laws. We do not discriminate on the basis of race, color, national origin, age, disability, sex, sexual orientation, or gender identity.            Thank you!     Thank you for choosing [facility]  for your care. Our goal is always to provide you with excellent care. Hearing back from our patients is one way we can continue to improve our services. Please take a few minutes to complete the written survey that you may receive in the mail after your visit with us. Thank you!             Your Updated Medication List - Protect others around you: Learn how to safely use, store and throw away your medicines at www.disposemymeds.org.          This list is accurate as of 5/16/18  3:14 PM.  Always use your most recent med list.                   Brand Name Dispense Instructions for use Diagnosis    aspirin 325 MG tablet      Take 325 mg by mouth daily        atorvastatin 20 MG tablet    LIPITOR     Take 20 mg by mouth daily        fish Oil 1200 MG capsule      Take 1,200 mg by mouth daily        GAS-X PO      Take 125 mg by mouth 3 times daily        metoprolol  succinate 25 MG 24 hr tablet    TOPROL-XL     Take 25 mg by mouth daily

## 2018-05-16 NOTE — PROGRESS NOTES
Service Date: 2018      RADIATION THERAPY SIMULATION NOTE       The patient was brought into the simulation suite and placed in a modified supine position.  Careful alignment was accomplished using orthogonal lasers.  Positioning aided with the use of a vacuum bag.  Imaging was acquired through the pelvis and isocenter placed.  All imaging will be used for highly conformal treatment planning for treatment of prostate carcinoma with appropriate dose/volume relationships characterized for rectum, bladder and bilateral proximal femurs.         RANULFO TAPIA MD             D: 2018   T: 2018   MT: KAE      Name:     ROLDAN MARQUES   MRN:      [id number]        Account:      [id number]   :      1945           Service Date: 2018      Document: [id number]

## 2018-05-16 NOTE — MR AVS SNAPSHOT
After Visit Summary   5/16/2018    Steven Lopez    MRN: [id number]           Patient Information     Date Of Birth          1945        Visit Information        Provider Department      5/16/2018 1:15 PM [facility]        Today's Diagnoses     Malignant neoplasm of prostate (H)    -  1       Follow-ups after your visit        Your next 10 appointments already scheduled     May 17, 2018  1:15 PM CDT   Treatment with [facility]   [facility] Oncology ([facility] )    [address] [address] [phone]            May 18, 2018  1:15 PM CDT   Treatment with [facility]   [facility] ([facility] )    [address] [address] [phone]            May 21, 2018  1:15 PM CDT   Treatment with [facility]   [facility] ([facility] )    [address] [address] [phone]            May 22, 2018  1:15 PM CDT   Treatment with [facility]   [facility] ([facility] )    [address] [address] [phone]            May 23, 2018  1:15 PM CDT   Treatment with [facility]   [facility] ([facility] )    [address] [address] [phone]            May 24, 2018  1:15 PM CDT   Treatment with [facility]   [facility] ([facility] )    [address] [address] [phone]            May 25, 2018  1:15 PM CDT   Treatment with [facility]   [facility] ([facility] )    [address] [address] [phone]            May 29, 2018  1:15 PM CDT   Treatment with [facility]   [facility] ([facility] )    [address] [address] [phone]            May 30, 2018  1:15 PM CDT   Treatment with [facility]   [facility] Radiation Oncology ([facility] )    [address] [address] [phone]            May 31, 2018  1:15 PM CDT   Treatment with [facility]   [facility] Radiation Oncology ([facility] )    [address] [address] [phone]              Who to contact     If you have questions or need follow up information about today's clinic visit or your schedule please contact [facility] directly at [phone].  Normal or non-critical lab and imaging results will be communicated to you by MyChart, letter or phone within 4 business days after the clinic has received the results. If you do not hear from us within 7 days, please contact the clinic through [employer] or phone. If you have a critical or abnormal lab result, we will notify you by phone as soon as possible.  Submit refill requests through [employer] or call your pharmacy and they will forward the refill request to us. Please allow 3 business days for your refill to be completed.          Additional Information About Your Visit        [employer] Information     [employer] lets you send messages to your doctor, view your test results, renew your prescriptions, schedule appointments and more. To sign up, go to [email or web address] . Click on \"Log in\" on the left side of the screen, which will take you to the Welcome page. Then click on \"Sign up Now\" on the right side of the page.     You will be asked to enter the access code listed below, as well as some personal information. Please follow the directions to create your username and password.     Your access code is: [email or web address]  Expires: 2018  2:55 PM     Your access code will  in 90 days. If you need help or a new code, please call your [place] clinic or [phone].        Care EveryWhere ID     This is your Care EveryWhere ID. This could be used by other organizations to access your [place] medical records  [id number]         Blood Pressure from Last 3 Encounters: "   04/20/18 176/82    Weight from Last 3 Encounters:   04/20/18 93.9 kg (207 lb)              Today, you had the following     No orders found for display       Primary Care Provider Office Phone # Fax #    Sabina KENDAL Flanagan [phone]       [place] [address] [address] [facility] [address]        Equal Access to Services     [facility]: Hadii aad ku hadasho Soomaali, waaxda luqadaha, qaybta kaalmada adeegyada, waxay idiin hayaan adeeg khtonysh lajimn . So [facility] [phone].    ATENCIÓN: Si habla español, tiene a bergman disposición servicios gratuitos de asistencia lingüística. Audreyame al [phone].    We comply with applicable federal civil rights laws and [place] laws. We do not discriminate on the basis of race, color, national origin, age, disability, sex, sexual orientation, or gender identity.            Thank you!     Thank you for choosing [facility]  for your care. Our goal is always to provide you with excellent care. Hearing back from our patients is one way we can continue to improve our services. Please take a few minutes to complete the written survey that you may receive in the mail after your visit with us. Thank you!             Your Updated Medication List - Protect others around you: Learn how to safely use, store and throw away your medicines at www.disposemymeds.org.          This list is accurate as of 5/16/18  1:33 PM.  Always use your most recent med list.                   Brand Name Dispense Instructions for use Diagnosis    aspirin 325 MG tablet      Take 325 mg by mouth daily        atorvastatin 20 MG tablet    LIPITOR     Take 20 mg by mouth daily        fish Oil 1200 MG capsule      Take 1,200 mg by mouth daily        metoprolol succinate 25 MG 24 hr tablet    TOPROL-XL     Take 25 mg by mouth daily

## 2018-05-17 ENCOUNTER — ALLIED HEALTH/NURSE VISIT (OUTPATIENT)
Dept: RADIATION ONCOLOGY | Facility: HOSPITAL | Age: 73
End: 2018-05-17

## 2018-05-17 DIAGNOSIS — C61 MALIGNANT NEOPLASM OF PROSTATE (H): Primary | ICD-10-CM

## 2018-05-17 PROCEDURE — 77385 ZZH IMRT TREATMENT DELIVERY, SIMPLE: CPT | Performed by: RADIOLOGY

## 2018-05-17 NOTE — MR AVS SNAPSHOT
After Visit Summary   5/17/2018    Steven Lopez    MRN: [id number]           Patient Information     Date Of Birth          1945        Visit Information        Provider Department      5/17/2018 1:15 PM [facility]        Today's Diagnoses     Malignant neoplasm of prostate (H)    -  1       Follow-ups after your visit        Your next 10 appointments already scheduled     May 18, 2018  1:15 PM CDT   Treatment with [facility]   [facility] Radiation Oncology ([facility] )    [address] [id number] [phone]            May 21, 2018  1:15 PM CDT   Treatment with [facility]   [facility] Radiation Oncology ([facility] )    [address] [id number] [phone]            May 22, 2018  1:15 PM CDT   Treatment with [facility]   [facility] Radiation Oncology ([facility] )    [address] [id number] [phone]            May 23, 2018  1:15 PM CDT   Treatment with [facility]   [facility] Radiation Oncology ([facility] )    [address] [id number] [phone]            May 24, 2018  1:15 PM CDT   Treatment with [facility]   [facility] Radiation Oncology ([facility] )    [address] [id number] [phone]            May 25, 2018  1:15 PM CDT   Treatment with [facility]   [facility] Radiation Oncology ([facility] )    [address] [id number] [phone]            May 29, 2018  1:15 PM CDT   Treatment with [facility]   [facility] Radiation Oncology ([facility] )    [address] [id number] [phone]            May 30, 2018  1:15 PM CDT   Treatment with [facility]   HI Radiation Oncology ([facility] )    [address] [id number] [phone]            May 31, 2018  1:15 PM CDT   Treatment with [facility]   HI Radiation Oncology ([facility] )    [address] [address] [phone]            2018  1:15 PM CDT   Treatment with [facility]   HI Radiation Oncology ([facility] )    [address] [address] [phone]              Who to contact     If you have questions or need follow up information about today's clinic visit or your schedule please contact [facility] directly at [phone].  Normal or non-critical lab and imaging results will be communicated to you by MyChart, letter or phone within 4 business days after the clinic has received the results. If you do not hear from us within 7 days, please contact the clinic through [employer] or phone. If you have a critical or abnormal lab result, we will notify you by phone as soon as possible.  Submit refill requests through [employer] or call your pharmacy and they will forward the refill request to us. Please allow 3 business days for your refill to be completed.          Additional Information About Your Visit        [employer] Information     [employer] lets you send messages to your doctor, view your test results, renew your prescriptions, schedule appointments and more. To sign up, go to [email or web address] . Click on \"Log in\" on the left side of the screen, which will take you to the Welcome page. Then click on \"Sign up Now\" on the right side of the page.     You will be asked to enter the access code listed below, as well as some personal information. Please follow the directions to create your username and password.     Your access code is: [email or web address]  Expires: 2018  2:55 PM     Your access code will  in 90 days. If you need help or a new code, please call your [place] clinic or [phone].        Care EveryWhere ID     This is your Care EveryWhere ID. This could be used by other organizations to access your [place] medical records  [id number]         Blood Pressure from Last 3 Encounters: "   05/16/18 144/82   04/20/18 176/82    Weight from Last 3 Encounters:   05/16/18 93 kg (205 lb)   04/20/18 93.9 kg (207 lb)              Today, you had the following     No orders found for display       Primary Care Provider Office Phone # Fax Luci Flanagan [phone]       [place] [address] [address]  [facility] [address]        Equal Access to Services     [facility]: Hadii aad ku hadasho Soomaali, waaxda luqadaha, qaybta kaalmada adeegyada, waxay idiin hayaan adeeg kharamaximiliano lagaviota . So [facility] [phone].    ATENCIÓN: Si adán so, tiene a bergman disposición servicios gratuitos de asistencia lingüística. Ll[facility] [phone].    We comply with applicable federal civil rights laws and [place] laws. We do not discriminate on the basis of race, color, national origin, age, disability, sex, sexual orientation, or gender identity.            Thank you!     Thank you for choosing [facility]  for your care. Our goal is always to provide you with excellent care. Hearing back from our patients is one way we can continue to improve our services. Please take a few minutes to complete the written survey that you may receive in the mail after your visit with us. Thank you!             Your Updated Medication List - Protect others around you: Learn how to safely use, store and throw away your medicines at www.disposemymeds.org.          This list is accurate as of 5/17/18  1:19 PM.  Always use your most recent med list.                   Brand Name Dispense Instructions for use Diagnosis    aspirin 325 MG tablet      Take 325 mg by mouth daily        atorvastatin 20 MG tablet    LIPITOR     Take 20 mg by mouth daily        fish Oil 1200 MG capsule      Take 1,200 mg by mouth daily        GAS-X PO      Take 125 mg by mouth 3 times daily        metoprolol succinate 25 MG 24 hr tablet    TOPROL-XL     Take 25 mg by mouth daily

## 2018-05-17 NOTE — PROGRESS NOTES
Steven Lopez received radiation therapy treatment today 05/17/18.    Trey Leyva  May 17, 2018  1:09 PM

## 2018-05-18 ENCOUNTER — ALLIED HEALTH/NURSE VISIT (OUTPATIENT)
Dept: RADIATION ONCOLOGY | Facility: HOSPITAL | Age: 73
End: 2018-05-18

## 2018-05-18 DIAGNOSIS — C61 MALIGNANT NEOPLASM OF PROSTATE (H): Primary | ICD-10-CM

## 2018-05-18 PROCEDURE — 77385 ZZH IMRT TREATMENT DELIVERY, SIMPLE: CPT | Performed by: RADIOLOGY

## 2018-05-18 NOTE — PROGRESS NOTES
Steven Lopez received radiation therapy treatment today 05/18/18.    Satnam Moise  May 18, 2018  1:18 PM

## 2018-05-18 NOTE — MR AVS SNAPSHOT
After Visit Summary   5/18/2018    Steven Lopez    MRN: [id number]           Patient Information     Date Of Birth          1945        Visit Information        Provider Department      5/18/2018 1:15 PM [facility]        Today's Diagnoses     Malignant neoplasm of prostate (H)    -  1       Follow-ups after your visit        Your next 10 appointments already scheduled     May 21, 2018  1:15 PM CDT   Treatment with [facility]   HI Radiation Oncology ([facility] )    [address] [id number] [phone]            May 22, 2018  1:15 PM CDT   Treatment with [facility]   [facility] Radiation Oncology ([facility] )    [address] [id number] [phone]            May 23, 2018  1:15 PM CDT   Treatment with [facility]   [facility] Radiation Oncology ([facility] )    [address] [id number] [phone]            May 24, 2018  1:15 PM CDT   Treatment with [facility]   [facility] Radiation Oncology ([facility] )    [address] [id number] [phone]            May 25, 2018  1:15 PM CDT   Treatment with [facility]   [facility] Radiation Oncology ([facility] )    [address] [id number] [phone]            May 29, 2018  1:15 PM CDT   Treatment with [facility]   [facility] Radiation Oncology ([facility] )    [address] [id number] [phone]            May 30, 2018  1:15 PM CDT   Treatment with [facility]   [facility] Radiation Oncology ([facility] )    [address] [id number] [phone]            May 31, 2018  1:15 PM CDT   Treatment with [facility]   [facility] Radiation Oncology ([facility] )    [address] [id number] [phone]            Jun 01, 2018  1:15 PM CDT   Treatment with [facility]   HI Radiation Oncology ([facility]  "[facility] )    [address] [address] [phone]            2018  1:15 PM CDT   Treatment with [facility]   HI Radiation Oncology ([facility] )    [address] [address] [phone]              Who to contact     If you have questions or need follow up information about today's clinic visit or your schedule please contact [facility] directly at [phone].  Normal or non-critical lab and imaging results will be communicated to you by MyChart, letter or phone within 4 business days after the clinic has received the results. If you do not hear from us within 7 days, please contact the clinic through [employer] or phone. If you have a critical or abnormal lab result, we will notify you by phone as soon as possible.  Submit refill requests through [employer] or call your pharmacy and they will forward the refill request to us. Please allow 3 business days for your refill to be completed.          Additional Information About Your Visit        [employer] Information     [employer] lets you send messages to your doctor, view your test results, renew your prescriptions, schedule appointments and more. To sign up, go to [email or web address] . Click on \"Log in\" on the left side of the screen, which will take you to the Welcome page. Then click on \"Sign up Now\" on the right side of the page.     You will be asked to enter the access code listed below, as well as some personal information. Please follow the directions to create your username and password.     Your access code is: [email or web address]  Expires: 2018  2:55 PM     Your access code will  in 90 days. If you need help or a new code, please call your [place] clinic or [phone].        Care EveryWhere ID     This is your Care EveryWhere ID. This could be used by other organizations to access your [place] medical records  [id number]         Blood Pressure from Last 3 Encounters: "   05/16/18 144/82   04/20/18 176/82    Weight from Last 3 Encounters:   05/16/18 93 kg (205 lb)   04/20/18 93.9 kg (207 lb)              Today, you had the following     No orders found for display       Primary Care Provider Office Phone # Fax Luci Flanagan [phone]       [place] [address] [address]  [facility] [address]        Equal Access to Services     [facility]: Hadii aad ku hadasho Soomaali, waaxda luqadaha, qaybta kaalmada adeegyada, waxay idiin hayaan adeeg kharamaximiliano lagaviota . So [facility] [phone].    ATENCIÓN: Si adán so, tiene a bergman disposición servicios gratuitos de asistencia lingüística. Ll[facility] [phone].    We comply with applicable federal civil rights laws and [place] laws. We do not discriminate on the basis of race, color, national origin, age, disability, sex, sexual orientation, or gender identity.            Thank you!     Thank you for choosing [facility]  for your care. Our goal is always to provide you with excellent care. Hearing back from our patients is one way we can continue to improve our services. Please take a few minutes to complete the written survey that you may receive in the mail after your visit with us. Thank you!             Your Updated Medication List - Protect others around you: Learn how to safely use, store and throw away your medicines at www.disposemymeds.org.          This list is accurate as of 5/18/18  1:26 PM.  Always use your most recent med list.                   Brand Name Dispense Instructions for use Diagnosis    aspirin 325 MG tablet      Take 325 mg by mouth daily        atorvastatin 20 MG tablet    LIPITOR     Take 20 mg by mouth daily        fish Oil 1200 MG capsule      Take 1,200 mg by mouth daily        GAS-X PO      Take 125 mg by mouth 3 times daily        metoprolol succinate 25 MG 24 hr tablet    TOPROL-XL     Take 25 mg by mouth daily

## 2018-05-21 ENCOUNTER — ALLIED HEALTH/NURSE VISIT (OUTPATIENT)
Dept: RADIATION ONCOLOGY | Facility: HOSPITAL | Age: 73
End: 2018-05-21

## 2018-05-21 DIAGNOSIS — C61 MALIGNANT NEOPLASM OF PROSTATE (H): Primary | ICD-10-CM

## 2018-05-21 PROCEDURE — 77385 ZZH IMRT TREATMENT DELIVERY, SIMPLE: CPT | Performed by: RADIOLOGY

## 2018-05-21 NOTE — PROGRESS NOTES
Steven Lopez received radiation therapy treatment today 05/21/18.    Kelly Pantoja  May 21, 2018  1:29 PM

## 2018-05-21 NOTE — MR AVS SNAPSHOT
After Visit Summary   5/21/2018    Steven Lopez    MRN: [id number]           Patient Information     Date Of Birth          1945        Visit Information        Provider Department      5/21/2018 1:15 PM [facility]        Today's Diagnoses     Malignant neoplasm of prostate (H)    -  1       Follow-ups after your visit        Your next 10 appointments already scheduled     May 22, 2018  1:15 PM CDT   Treatment with [facility]   HI Radiation Oncology ([facility] )    [address] [address] [phone]            May 23, 2018  1:15 PM CDT   Treatment with [facility]   [facility] Radiation Oncology ([facility] )    [address] [address] [phone]            May 24, 2018  1:15 PM CDT   Treatment with [facility]   [facility] Radiation Oncology ([facility] )    [address] [address] [phone]            May 25, 2018  1:15 PM CDT   Treatment with [facility]   HI Radiation Oncology ([facility] )    [address] [address] [phone]            May 29, 2018  1:15 PM CDT   Treatment with [facility] CL[facility] IX   [facility] Radiation Oncology ([facility] )    [address] [address] [phone]            May 30, 2018  1:15 PM CDT   Treatment with [facility]   [facility] Radiation Oncology ([facility] )    [address] [id number] [phone]            May 31, 2018  1:15 PM CDT   Treatment with [facility]   [facility] Radiation Oncology ([facility] )    [address] [id number] [phone]            Jun 01, 2018  1:15 PM CDT   Treatment with [facility]   [facility] Radiation Oncology ([facility] )    [address] [address] [phone]            Jun 04, 2018  1:15 PM CDT   Treatment with [facility] CL[facility] IX   HI Radiation Oncology ([facility]  "[facility] )    [address] [address] [phone]            2018  1:15 PM CDT   Treatment with [facility] CL[facility] IX   HI Radiation Oncology ([facility] )    [address] [address] [phone]              Who to contact     If you have questions or need follow up information about today's clinic visit or your schedule please contact [facility] directly at [phone].  Normal or non-critical lab and imaging results will be communicated to you by MyChart, letter or phone within 4 business days after the clinic has received the results. If you do not hear from us within 7 days, please contact the clinic through [employer] or phone. If you have a critical or abnormal lab result, we will notify you by phone as soon as possible.  Submit refill requests through [employer] or call your pharmacy and they will forward the refill request to us. Please allow 3 business days for your refill to be completed.          Additional Information About Your Visit        [employer] Information     [employer] lets you send messages to your doctor, view your test results, renew your prescriptions, schedule appointments and more. To sign up, go to [email or web address] . Click on \"Log in\" on the left side of the screen, which will take you to the Welcome page. Then click on \"Sign up Now\" on the right side of the page.     You will be asked to enter the access code listed below, as well as some personal information. Please follow the directions to create your username and password.     Your access code is: [email or web address]  Expires: 2018  2:55 PM     Your access code will  in 90 days. If you need help or a new code, please call your [place] clinic or [phone].        Care EveryWhere ID     This is your Care EveryWhere ID. This could be used by other organizations to access your [place] medical records  [id number]         Blood Pressure from Last 3 Encounters: "   05/16/18 144/82   04/20/18 176/82    Weight from Last 3 Encounters:   05/16/18 93 kg (205 lb)   04/20/18 93.9 kg (207 lb)              Today, you had the following     No orders found for display       Primary Care Provider Office Phone # Fax Luci Flanagan [phone]       [place] [address] [address]  [facility] [address]        Equal Access to Services     [facility]: Hadii aad ku hadasho Soomaali, waaxda luqadaha, qaybta kaalmada adeegyada, waxay idiin hayaan adeeg kharamaximiliano lagaviota . So [facility] [phone].    ATENCIÓN: Si adán so, tiene a bergman disposición servicios gratuitos de asistencia lingüística. Ll[facility] [phone].    We comply with applicable federal civil rights laws and [place] laws. We do not discriminate on the basis of race, color, national origin, age, disability, sex, sexual orientation, or gender identity.            Thank you!     Thank you for choosing [facility]  for your care. Our goal is always to provide you with excellent care. Hearing back from our patients is one way we can continue to improve our services. Please take a few minutes to complete the written survey that you may receive in the mail after your visit with us. Thank you!             Your Updated Medication List - Protect others around you: Learn how to safely use, store and throw away your medicines at www.disposemymeds.org.          This list is accurate as of 5/21/18  1:33 PM.  Always use your most recent med list.                   Brand Name Dispense Instructions for use Diagnosis    aspirin 325 MG tablet      Take 325 mg by mouth daily        atorvastatin 20 MG tablet    LIPITOR     Take 20 mg by mouth daily        fish Oil 1200 MG capsule      Take 1,200 mg by mouth daily        GAS-X PO      Take 125 mg by mouth 3 times daily        metoprolol succinate 25 MG 24 hr tablet    TOPROL-XL     Take 25 mg by mouth daily

## 2018-05-22 ENCOUNTER — ALLIED HEALTH/NURSE VISIT (OUTPATIENT)
Dept: RADIATION ONCOLOGY | Facility: HOSPITAL | Age: 73
End: 2018-05-22

## 2018-05-22 DIAGNOSIS — C61 MALIGNANT NEOPLASM OF PROSTATE (H): Primary | ICD-10-CM

## 2018-05-22 DIAGNOSIS — R30.0 DYSURIA: ICD-10-CM

## 2018-05-22 PROCEDURE — 77385 ZZH IMRT TREATMENT DELIVERY, SIMPLE: CPT | Performed by: RADIOLOGY

## 2018-05-22 PROCEDURE — 77336 RADIATION PHYSICS CONSULT: CPT | Performed by: RADIOLOGY

## 2018-05-22 NOTE — MR AVS SNAPSHOT
After Visit Summary   5/22/2018    Steven Lopez    MRN: [id number]           Patient Information     Date Of Birth          1945        Visit Information        Provider Department      5/22/2018 1:15 PM [facility]        Today's Diagnoses     Malignant neoplasm of prostate (H)    -  1       Follow-ups after your visit        Your next 10 appointments already scheduled     May 23, 2018  1:15 PM CDT   Treatment with [facility]   [facility] ([facility] )    [address] [address] [phone]            May 24, 2018  1:15 PM CDT   Treatment with [facility]   [facility] ([facility] )    [address] [address] [phone]            May 25, 2018  1:15 PM CDT   Treatment with [facility]   [facility] ([facility] )    [address] [address] [phone]            May 29, 2018  1:15 PM CDT   Treatment with [facility]   [facility] ([facility] )    [address] [address] [phone]            May 30, 2018  1:15 PM CDT   Treatment with [facility]   [facility] ([facility] )    [address] [address] [phone]            May 31, 2018  1:15 PM CDT   Treatment with [facility]   [facility] ([facility] )    [address] [address] [phone]            Jun 01, 2018  1:15 PM CDT   Treatment with [facility]   [facility] ([facility] )    [address] [address]   [phone]            Jun 04, 2018  1:15 PM CDT   Treatment with [facility]   [facility] Oncology ([facility] )    [address] [address]   [phone]            Jun 05, 2018  1:15 PM CDT   Treatment with [facility]   [facility] Radiation Oncology ([facility]  "[facility] )    [address] [address] [phone]            2018  1:15 PM CDT   Treatment with [facility]   [facility] Radiation Oncology ([facility] )    [address] [address] [phone]              Who to contact     If you have questions or need follow up information about today's clinic visit or your schedule please contact [facility] directly at [phone].  Normal or non-critical lab and imaging results will be communicated to you by MyChart, letter or phone within 4 business days after the clinic has received the results. If you do not hear from us within 7 days, please contact the clinic through [employer] or phone. If you have a critical or abnormal lab result, we will notify you by phone as soon as possible.  Submit refill requests through [employer] or call your pharmacy and they will forward the refill request to us. Please allow 3 business days for your refill to be completed.          Additional Information About Your Visit        [employer] Information     [employer] lets you send messages to your doctor, view your test results, renew your prescriptions, schedule appointments and more. To sign up, go to [email or web address] . Click on \"Log in\" on the left side of the screen, which will take you to the Welcome page. Then click on \"Sign up Now\" on the right side of the page.     You will be asked to enter the access code listed below, as well as some personal information. Please follow the directions to create your username and password.     Your access code is: [email or web address]  Expires: 2018  2:55 PM     Your access code will  in 90 days. If you need help or a new code, please call your [place] clinic or [phone].        Care EveryWhere ID     This is your Care EveryWhere ID. This could be used by other organizations to access your [place] medical records  [id number]         Blood Pressure from Last 3 Encounters: "   05/16/18 144/82   04/20/18 176/82    Weight from Last 3 Encounters:   05/16/18 93 kg (205 lb)   04/20/18 93.9 kg (207 lb)              Today, you had the following     No orders found for display       Primary Care Provider Office Phone # Fax Luci Flanagan [phone]       [place] [address] [address]  [facility] [address]        Equal Access to Services     [facility]: Hadii aad ku hadasho Soomaali, waaxda luqadaha, qaybta kaalmada adeegyada, waxay idiin hayaan adeeg kharamaximiliano lagaviota . So [facility] [phone].    ATENCIÓN: Si adán so, tiene a bergman disposición servicios gratuitos de asistencia lingüística. [facility] [phone].    We comply with applicable federal civil rights laws and [place] laws. We do not discriminate on the basis of race, color, national origin, age, disability, sex, sexual orientation, or gender identity.            Thank you!     Thank you for choosing [facility]  for your care. Our goal is always to provide you with excellent care. Hearing back from our patients is one way we can continue to improve our services. Please take a few minutes to complete the written survey that you may receive in the mail after your visit with us. Thank you!             Your Updated Medication List - Protect others around you: Learn how to safely use, store and throw away your medicines at www.disposemymeds.org.          This list is accurate as of 5/22/18  1:23 PM.  Always use your most recent med list.                   Brand Name Dispense Instructions for use Diagnosis    aspirin 325 MG tablet      Take 325 mg by mouth daily        atorvastatin 20 MG tablet    LIPITOR     Take 20 mg by mouth daily        fish Oil 1200 MG capsule      Take 1,200 mg by mouth daily        GAS-X PO      Take 125 mg by mouth 3 times daily        metoprolol succinate 25 MG 24 hr tablet    TOPROL-XL     Take 25 mg by mouth daily

## 2018-05-22 NOTE — PROGRESS NOTES
Steven Lopez received radiation therapy treatment today 05/22/18.    Renetta Hughes  May 22, 2018  1:20 PM

## 2018-05-23 ENCOUNTER — ALLIED HEALTH/NURSE VISIT (OUTPATIENT)
Dept: RADIATION ONCOLOGY | Facility: HOSPITAL | Age: 73
End: 2018-05-23

## 2018-05-23 ENCOUNTER — OFFICE VISIT (OUTPATIENT)
Dept: RADIATION ONCOLOGY | Facility: HOSPITAL | Age: 73
End: 2018-05-23

## 2018-05-23 VITALS
SYSTOLIC BLOOD PRESSURE: 170 MMHG | RESPIRATION RATE: 16 BRPM | WEIGHT: 201 LBS | HEART RATE: 56 BPM | DIASTOLIC BLOOD PRESSURE: 78 MMHG | BODY MASS INDEX: 30.56 KG/M2

## 2018-05-23 DIAGNOSIS — C61 MALIGNANT NEOPLASM OF PROSTATE (H): Primary | ICD-10-CM

## 2018-05-23 PROCEDURE — 77385 ZZH IMRT TREATMENT DELIVERY, SIMPLE: CPT | Performed by: RADIOLOGY

## 2018-05-23 NOTE — PROGRESS NOTES
Steven Lopez received radiation therapy treatment today 05/23/18.    Trey Leyva  May 23, 2018  1:28 PM

## 2018-05-23 NOTE — MR AVS SNAPSHOT
After Visit Summary   5/23/2018    Steven Lopez    MRN: [id number]           Patient Information     Date Of Birth          1945        Visit Information        Provider Department      5/23/2018 1:30 PM Noel Ballesteros MD [facility]        Today's Diagnoses     Malignant neoplasm of prostate (H)    -  1       Follow-ups after your visit        Your next 10 appointments already scheduled     May 25, 2018  1:15 PM CDT   Treatment with [facility]   [facility] Radiation Oncology ([facility] )    [address] [address]   [phone]            May 29, 2018  1:15 PM CDT   Treatment with [facility]   [facility] Radiation Oncology ([facility] )    [address] [address]   [phone]            May 30, 2018  1:15 PM CDT   Treatment with [facility]   [facility] Radiation Oncology ([facility] )    [address] [address]   [phone]            May 31, 2018  1:15 PM CDT   Treatment with [facility]   [facility] Rad[facility] Oncology ([facility] )    [address] [address]   [phone]            Jun 01, 2018  1:15 PM CDT   Treatment with [facility]   [facility] Radiation Oncology ([facility] )    [address] [address]   [phone]            Jun 04, 2018  1:15 PM CDT   Treatment with [facility]   [facility] Radiation Oncology ([facility] )    [address] [address]   [phone]            Jun 05, 2018  1:15 PM CDT   Treatment with [facility]   [facility] Radiation Oncology ([facility] )    [address] [address]   [phone]            Jun 06, 2018  1:15 PM CDT   Treatment with [facility]   [facility] Radiation Oncology ([facility] )    [address] [address]   [phone]            Jun 07, 2018  1:15 PM CDT   Treatment with [facility]   [facility] Radiation Oncology ([place]  [facility] )    [address] [address] [phone]            2018  1:15 PM CDT   Treatment with [facility]   [facility] Radiation Oncology ([facility] )    [address] [address] [phone]              Who to contact     If you have questions or need follow up information about today's clinic visit or your schedule please contact [facility] directly at [phone].  Normal or non-critical lab and imaging results will be communicated to you by MyChart, letter or phone within 4 business days after the clinic has received the results. If you do not hear from us within 7 days, please contact the clinic through [employer] or phone. If you have a critical or abnormal lab result, we will notify you by phone as soon as possible.  Submit refill requests through [employer] or call your pharmacy and they will forward the refill request to us. Please allow 3 business days for your refill to be completed.          Additional Information About Your Visit        [employer] Information     [employer] lets you send messages to your doctor, view your test results, renew your prescriptions, schedule appointments and more. To sign up, go to [email or web address] . Click on \"Log in\" on the left side of the screen, which will take you to the Welcome page. Then click on \"Sign up Now\" on the right side of the page.     You will be asked to enter the access code listed below, as well as some personal information. Please follow the directions to create your username and password.     Your access code is: [email or web address]  Expires: 2018  2:55 PM     Your access code will  in 90 days. If you need help or a new code, please call your [place] clinic or [phone].        Care EveryWhere ID     This is your Care EveryWhere ID. This could be used by other organizations to access your [place] medical records  [id number]        Your Vitals Were     Pulse " Respirations BMI (Body Mass Index)             56 16 30.56 kg/m2          Blood Pressure from Last 3 Encounters:   05/23/18 170/78   05/16/18 144/82   04/20/18 176/82    Weight from Last 3 Encounters:   05/23/18 91.2 kg (201 lb)   05/16/18 93 kg (205 lb)   04/20/18 93.9 kg (207 lb)              Today, you had the following     No orders found for display       Primary Care Provider Office Phone # Fax #    Sabina Panchito, [phone]       [place] [address] [address] [facility] [address]        Equal Access to Services     [facility]: Hadii charlie carolina hadazar Manzano, waaxda lualaynaadaha, qaybta kaalmada michelle, steven victoria . So [facility] [phone].    ATENCIÓN: Si habla español, tiene a bergman disposición servicios gratuitos de asistencia lingüística. Llame al [phone].    We comply with applicable federal civil rights laws and [place] laws. We do not discriminate on the basis of race, color, national origin, age, disability, sex, sexual orientation, or gender identity.            Thank you!     Thank you for choosing [facility]  for your care. Our goal is always to provide you with excellent care. Hearing back from our patients is one way we can continue to improve our services. Please take a few minutes to complete the written survey that you may receive in the mail after your visit with us. Thank you!             Your Updated Medication List - Protect others around you: Learn how to safely use, store and throw away your medicines at www.disposemymeds.org.          This list is accurate as of 5/23/18 11:59 PM.  Always use your most recent med list.                   Brand Name Dispense Instructions for use Diagnosis    aspirin 325 MG tablet      Take 325 mg by mouth daily        atorvastatin 20 MG tablet    LIPITOR     Take 20 mg by mouth daily        fish Oil 1200 MG capsule      Take 1,200 mg by mouth daily        GAS-X PO      Take 125 mg by  mouth 3 times daily        metoprolol succinate 25 MG 24 hr tablet    TOPROL-XL     Take 25 mg by mouth daily

## 2018-05-23 NOTE — PROGRESS NOTES
Service Date: 2018      DIAGNOSIS:  Prostate carcinoma, Dorys 3+3.  However, with marked acceleration and PSA historically which is currently 9.95.       RADIATION RX:  The patient has received 2090 cGy to date for treatment of the above-described prostate carcinoma.  He had a little bit of dysuria yesterday, but this has resolved today and he desires no further intervention.      OBJECTIVE:  Weight: 201 pounds.  Abdomen is benign, nontender.  Bowel sounds present and active.      IMPRESSION:  Routine tolerance to radiation therapy for prostate carcinoma.      PLAN:  Continue treatment as planned.         RANULFO TAPIA MD             D: 2018   T: 2018   MT: CORY      Name:     ROLDAN MARQUES   MRN:      [id number]        Account:      [id number]   :      1945           Service Date: 2018      Document: [id number]       cc: Sabina Flanagan DNP

## 2018-05-23 NOTE — MR AVS SNAPSHOT
After Visit Summary   5/23/2018    Steven Lopez    MRN: [id number]           Patient Information     Date Of Birth          1945        Visit Information        Provider Department      5/23/2018 1:15 PM [facility]        Today's Diagnoses     Malignant neoplasm of prostate (H)    -  1       Follow-ups after your visit        Your next 10 appointments already scheduled     May 24, 2018  1:15 PM CDT   Treatment with [facility]   [facility] Radiation Oncology ([facility] )    [address] [address] [phone]            May 25, 2018  1:15 PM CDT   Treatment with [facility]   [facility] Radiation Oncology ([facility] )    [address] [address] [phone]            May 29, 2018  1:15 PM CDT   Treatment with [facility]   [facility] Radiation Oncology ([facility] )    [address] [address]   [phone]            May 30, 2018  1:15 PM CDT   Treatment with [facility]   [facility] Radiation Oncology ([facility] )    [address] [address] [phone]            May 31, 2018  1:15 PM CDT   Treatment with [facility]   [facility] Radiation Oncology ([facility] )    [address] [address]   [phone]            Jun 01, 2018  1:15 PM CDT   Treatment with [facility]   [facility] Radiation Oncology ([facility] )    [address] [address]   [phone]            Jun 04, 2018  1:15 PM CDT   Treatment with [facility]   [facility] Radiation Oncology ([facility] )    [address] [address]   [phone]            Jun 05, 2018  1:15 PM CDT   Treatment with [facility]   [facility] Radiation Oncology ([facility] )    [address] [address]   [phone]            Jun 06, 2018  1:15 PM CDT   Treatment with [facility] IX   [facility] Radiation Oncology ([facility]  "[facility] )    [address] [place] [id number] [phone]            2018  1:15 PM CDT   Treatment with [facility]   [facility] Radiation Oncology ([facility] )    [address] [address] [phone]              Future tests that were ordered for you today     Open Future Orders        Priority Expected Expires Ordered    Routine UA with micro reflex to culture Routine 2018            Who to contact     If you have questions or need follow up information about today's clinic visit or your schedule please contact [facility] directly at [phone].  Normal or non-critical lab and imaging results will be communicated to you by [employer], letter or phone within 4 business days after the clinic has received the results. If you do not hear from us within 7 days, please contact the clinic through [employer] or phone. If you have a critical or abnormal lab result, we will notify you by phone as soon as possible.  Submit refill requests through [employer] or call your pharmacy and they will forward the refill request to us. Please allow 3 business days for your refill to be completed.          Additional Information About Your Visit        [employer] Information     [employer] lets you send messages to your doctor, view your test results, renew your prescriptions, schedule appointments and more. To sign up, go to [email or web address] . Click on \"Log in\" on the left side of the screen, which will take you to the Welcome page. Then click on \"Sign up Now\" on the right side of the page.     You will be asked to enter the access code listed below, as well as some personal information. Please follow the directions to create your username and password.     Your access code is: [email or web address]  Expires: 2018  2:55 PM     Your access code will  in 90 days. If you need help or a new code, please call your [place] clinic or [phone].      "   Care EveryWhere ID     This is your Care EveryWhere ID. This could be used by other organizations to access your [place] medical records  [id number]         Blood Pressure from Last 3 Encounters:   05/16/18 144/82   04/20/18 176/82    Weight from Last 3 Encounters:   05/16/18 93 kg (205 lb)   04/20/18 93.9 kg (207 lb)              Today, you had the following     No orders found for display       Primary Care Provider Office Phone # Fax #    Sabina Machadoard, [phone]       [place] [address] [address] [facility] [address]        Equal Access to Services     [facility]: Hadii aad ku hadasho Soomaali, waaxda luqadaha, qaybta kaalmada adeegyada, steven goddard adetarah victoria . So [facility] [phone].    ATENCIÓN: Si habla español, tiene a bergman disposición servicios gratuitos de asistencia lingüística. [facility] [phone].    We comply with applicable federal civil rights laws and [place] laws. We do not discriminate on the basis of race, color, national origin, age, disability, sex, sexual orientation, or gender identity.            Thank you!     Thank you for choosing [facility]  for your care. Our goal is always to provide you with excellent care. Hearing back from our patients is one way we can continue to improve our services. Please take a few minutes to complete the written survey that you may receive in the mail after your visit with us. Thank you!             Your Updated Medication List - Protect others around you: Learn how to safely use, store and throw away your medicines at www.disposemymeds.org.          This list is accurate as of 5/23/18  1:38 PM.  Always use your most recent med list.                   Brand Name Dispense Instructions for use Diagnosis    aspirin 325 MG tablet      Take 325 mg by mouth daily        atorvastatin 20 MG tablet    LIPITOR     Take 20 mg by mouth daily        fish Oil 1200 MG capsule      Take 1,200 mg by mouth  daily        GAS-X PO      Take 125 mg by mouth 3 times daily        metoprolol succinate 25 MG 24 hr tablet    TOPROL-XL     Take 25 mg by mouth daily

## 2018-05-24 ENCOUNTER — ALLIED HEALTH/NURSE VISIT (OUTPATIENT)
Dept: RADIATION ONCOLOGY | Facility: HOSPITAL | Age: 73
End: 2018-05-24

## 2018-05-24 DIAGNOSIS — C61 MALIGNANT NEOPLASM OF PROSTATE (H): Primary | ICD-10-CM

## 2018-05-24 PROCEDURE — 77385 ZZH IMRT TREATMENT DELIVERY, SIMPLE: CPT | Performed by: RADIOLOGY

## 2018-05-24 NOTE — MR AVS SNAPSHOT
After Visit Summary   5/24/2018    Steven Lopez    MRN: [id number]           Patient Information     Date Of Birth          1945        Visit Information        Provider Department      5/24/2018 1:15 PM [facility]        Today's Diagnoses     Malignant neoplasm of prostate (H)    -  1       Follow-ups after your visit        Your next 10 appointments already scheduled     May 25, 2018  1:15 PM CDT   Treatment with [facility]   [facility] ([facility] )    [address] [address] [phone]            May 29, 2018  1:15 PM CDT   Treatment with [facility]   [facility] ([facility] )    [address] [address]   [phone]            May 30, 2018  1:15 PM CDT   Treatment with [facility]   [facility] ([facility] )    [address] [address]   [phone]            May 31, 2018  1:15 PM CDT   Treatment with [facility]   [facility] ([facility] )    [address] [address]   [phone]            Jun 01, 2018  1:15 PM CDT   Treatment with [facility]   [facility]cology ([facility] )    [address] [address]   [phone]            Jun 04, 2018  1:15 PM CDT   Treatment with [facility]   [facility] Rad[facility]cology ([facility] )    [address] [address]   [phone]            Jun 05, 2018  1:15 PM CDT   Treatment with [facility]   [facility] Rad[facility] Oncology ([facility] )    [address] [address]   [phone]            Jun 06, 2018  1:15 PM CDT   Treatment with [facility]   [facility] Radiation Oncology ([facility] )    [address] [address]   [phone]            Jun 07, 2018  1:15 PM CDT   Treatment with [facility]   [facility] Radiation Oncology ([facility]  "[facility] )    [address] [address] [phone]            2018  1:15 PM CDT   Treatment with [facility]   HI Radiation Oncology ([facility] )    [address] [address] [phone]              Who to contact     If you have questions or need follow up information about today's clinic visit or your schedule please contact [facility] directly at [phone].  Normal or non-critical lab and imaging results will be communicated to you by MyChart, letter or phone within 4 business days after the clinic has received the results. If you do not hear from us within 7 days, please contact the clinic through [employer] or phone. If you have a critical or abnormal lab result, we will notify you by phone as soon as possible.  Submit refill requests through [employer] or call your pharmacy and they will forward the refill request to us. Please allow 3 business days for your refill to be completed.          Additional Information About Your Visit        [employer] Information     [employer] lets you send messages to your doctor, view your test results, renew your prescriptions, schedule appointments and more. To sign up, go to [email or web address] . Click on \"Log in\" on the left side of the screen, which will take you to the Welcome page. Then click on \"Sign up Now\" on the right side of the page.     You will be asked to enter the access code listed below, as well as some personal information. Please follow the directions to create your username and password.     Your access code is: [email or web address]  Expires: 2018  2:55 PM     Your access code will  in 90 days. If you need help or a new code, please call your [place] clinic or [phone].        Care EveryWhere ID     This is your Care EveryWhere ID. This could be used by other organizations to access your [place] medical records  [id number]         Blood Pressure from Last 3 Encounters: "   05/23/18 170/78   05/16/18 144/82   04/20/18 176/82    Weight from Last 3 Encounters:   05/23/18 91.2 kg (201 lb)   05/16/18 93 kg (205 lb)   04/20/18 93.9 kg (207 lb)              Today, you had the following     No orders found for display       Primary Care Provider Office Phone # Fax #    Sabina Flanagan, [phone]       [place] [address] [address] [facility] [address]        Equal Access to Services     [facility]: Hadii charlie ku hadasho Soomaali, waaxda luqadaha, qaybta kaalmada adeegyada, steven victoria . So [facility] [phone].    ATENCIÓN: Si habla español, tiene a bergman disposición servicios gratuitos de asistencia lingüística. LlCleveland Clinic [phone].    We comply with applicable federal civil rights laws and [place] laws. We do not discriminate on the basis of race, color, national origin, age, disability, sex, sexual orientation, or gender identity.            Thank you!     Thank you for choosing [facility]  for your care. Our goal is always to provide you with excellent care. Hearing back from our patients is one way we can continue to improve our services. Please take a few minutes to complete the written survey that you may receive in the mail after your visit with us. Thank you!             Your Updated Medication List - Protect others around you: Learn how to safely use, store and throw away your medicines at www.disposemymeds.org.          This list is accurate as of 5/24/18  1:35 PM.  Always use your most recent med list.                   Brand Name Dispense Instructions for use Diagnosis    aspirin 325 MG tablet      Take 325 mg by mouth daily        atorvastatin 20 MG tablet    LIPITOR     Take 20 mg by mouth daily        fish Oil 1200 MG capsule      Take 1,200 mg by mouth daily        GAS-X PO      Take 125 mg by mouth 3 times daily        metoprolol succinate 25 MG 24 hr tablet    TOPROL-XL     Take 25 mg by mouth daily

## 2018-05-24 NOTE — PROGRESS NOTES
Steven Lopez received radiation therapy treatment today 05/24/18.    Satnam Moise  May 24, 2018  1:32 PM

## 2018-05-24 NOTE — PROGRESS NOTES
Service Date: 2018      RADIATION PROGRESS NOTE      DIAGNOSIS:  Prostate carcinoma, Dorys 3 + 3 with marked acceleration in PSA history, currently 9.95.      RADIATION RX:  The patient has received 1140 cGy to date for treatment of a histologically but PSA concerning low-risk prostate carcinoma.      SUBJECTIVE:  Doing just fine with his treatment, really notes no side effects.      OBJECTIVE:  Weight, 205.7 pounds.  Abdomen is benign.      IMPRESSION:  Routine tolerance to radiation therapy for prostate carcinoma.      PLAN:  Continue treatment as planned.         RANULFO TAPIA MD             D: 2018   T: 2018   MT: CORY      Name:     ROLDAN MARQUES   MRN:      [id number]        Account:      [id number]   :      1945           Service Date: 2018      Document: [id number]

## 2018-05-25 ENCOUNTER — ALLIED HEALTH/NURSE VISIT (OUTPATIENT)
Dept: RADIATION ONCOLOGY | Facility: HOSPITAL | Age: 73
End: 2018-05-25

## 2018-05-25 DIAGNOSIS — C61 MALIGNANT NEOPLASM OF PROSTATE (H): Primary | ICD-10-CM

## 2018-05-25 PROCEDURE — 77385 ZZH IMRT TREATMENT DELIVERY, SIMPLE: CPT | Performed by: RADIOLOGY

## 2018-05-25 NOTE — MR AVS SNAPSHOT
After Visit Summary   5/25/2018    Steven Lopez    MRN: [id number]           Patient Information     Date Of Birth          1945        Visit Information        Provider Department      5/25/2018 1:15 PM [facility] Rad[facility]        Today's Diagnoses     Malignant neoplasm of prostate (H)    -  1       Follow-ups after your visit        Your next 10 appointments already scheduled     May 29, 2018  1:15 PM CDT   Treatment with [facility]   [facility] Radiation Oncology ([facility] )    [address] [address] [phone]            May 30, 2018  1:15 PM CDT   Treatment with [facility]   [facility] Radiation Oncology ([facility] )    [address] [address]   [phone]            May 31, 2018  1:15 PM CDT   Treatment with [facility]   [facility] Radiation Oncology ([facility] )    [address] [address]   [phone]            Jun 01, 2018  1:15 PM CDT   Treatment with [facility]   [facility] Radiation Oncology ([facility] )    [address] [address]   [phone]            Jun 04, 2018  1:15 PM CDT   Treatment with [facility]   [facility] Radiation Oncology ([facility] )    [address] [address]   [phone]            Jun 05, 2018  1:15 PM CDT   Treatment with [facility]   [facility] Radiation Oncology ([facility] )    [address] [address]   [phone]            Jun 06, 2018  1:15 PM CDT   Treatment with [facility]   [facility] Radiation Oncology ([facility] )    [address] [address]   [phone]            Jun 07, 2018  1:15 PM CDT   Treatment with [facility]   [facility] Radiation Oncology ([facility] )    [address] [address]   [phone]            Jun 08, 2018  1:15 PM CDT   Treatment with [facility] CL[facility] IX   HI Radiation Oncology ([facility]  "[facility] )    [address] [address] [phone]            2018  1:15 PM CDT   Treatment with [facility]   [facility] Radiation Oncology ([facility] )    [address] [address] [phone]              Who to contact     If you have questions or need follow up information about today's clinic visit or your schedule please contact [facility] directly at [phone].  Normal or non-critical lab and imaging results will be communicated to you by MyChart, letter or phone within 4 business days after the clinic has received the results. If you do not hear from us within 7 days, please contact the clinic through "[employer]hart or phone. If you have a critical or abnormal lab result, we will notify you by phone as soon as possible.  Submit refill requests through [employer] or call your pharmacy and they will forward the refill request to us. Please allow 3 business days for your refill to be completed.          Additional Information About Your Visit        [employer] Information     [employer] lets you send messages to your doctor, view your test results, renew your prescriptions, schedule appointments and more. To sign up, go to [email or web address] . Click on \"Log in\" on the left side of the screen, which will take you to the Welcome page. Then click on \"Sign up Now\" on the right side of the page.     You will be asked to enter the access code listed below, as well as some personal information. Please follow the directions to create your username and password.     Your access code is: [email or web address]  Expires: 2018  2:55 PM     Your access code will  in 90 days. If you need help or a new code, please call your [place] clinic or [phone].        Care EveryWhere ID     This is your Care EveryWhere ID. This could be used by other organizations to access your [place] medical records  [id number]         Blood Pressure from Last 3 Encounters: "   05/23/18 170/78   05/16/18 144/82   04/20/18 176/82    Weight from Last 3 Encounters:   05/23/18 91.2 kg (201 lb)   05/16/18 93 kg (205 lb)   04/20/18 93.9 kg (207 lb)              Today, you had the following     No orders found for display       Primary Care Provider Office Phone # Fax #    Sabina Flanagan, [phone]       [place] [address] [address] [facility] [address]        Equal Access to Services     [facility]: Hadii charlie ku hadasho Soomaali, waaxda luqadaha, qaybta kaalmada adeegyada, steven victoria . So [facility] [phone].    ATENCIÓN: Si habla español, tiene a bergman disposición servicios gratuitos de asistencia lingüística. Ll[facility] [phone].    We comply with applicable federal civil rights laws and [place] laws. We do not discriminate on the basis of race, color, national origin, age, disability, sex, sexual orientation, or gender identity.            Thank you!     Thank you for choosing [facility]  for your care. Our goal is always to provide you with excellent care. Hearing back from our patients is one way we can continue to improve our services. Please take a few minutes to complete the written survey that you may receive in the mail after your visit with us. Thank you!             Your Updated Medication List - Protect others around you: Learn how to safely use, store and throw away your medicines at www.disposemymeds.org.          This list is accurate as of 5/25/18  1:28 PM.  Always use your most recent med list.                   Brand Name Dispense Instructions for use Diagnosis    aspirin 325 MG tablet      Take 325 mg by mouth daily        atorvastatin 20 MG tablet    LIPITOR     Take 20 mg by mouth daily        fish Oil 1200 MG capsule      Take 1,200 mg by mouth daily        GAS-X PO      Take 125 mg by mouth 3 times daily        metoprolol succinate 25 MG 24 hr tablet    TOPROL-XL     Take 25 mg by mouth daily

## 2018-05-25 NOTE — PROGRESS NOTES
Steven Lopez received radiation therapy treatment today 05/25/18.    Renetta Hughes  May 25, 2018  1:26 PM

## 2018-05-29 ENCOUNTER — ALLIED HEALTH/NURSE VISIT (OUTPATIENT)
Dept: RADIATION ONCOLOGY | Facility: HOSPITAL | Age: 73
End: 2018-05-29

## 2018-05-29 DIAGNOSIS — C61 MALIGNANT NEOPLASM OF PROSTATE (H): Primary | ICD-10-CM

## 2018-05-29 PROCEDURE — 77385 ZZH IMRT TREATMENT DELIVERY, SIMPLE: CPT | Performed by: RADIOLOGY

## 2018-05-29 NOTE — MR AVS SNAPSHOT
After Visit Summary   5/29/2018    Steven Lopez    MRN: [id number]           Patient Information     Date Of Birth          1945        Visit Information        Provider Department      5/29/2018 1:15 PM [facility]        Today's Diagnoses     Malignant neoplasm of prostate (H)    -  1       Follow-ups after your visit        Your next 10 appointments already scheduled     May 30, 2018  1:15 PM CDT   Treatment with [facility]   [facility] Radiation Oncology ([facility] )    [address] [address] [phone]            May 31, 2018  1:15 PM CDT   Treatment with [facility]   [facility] Radiation Oncology ([facility] )    [address] [address]   [phone]            Jun 01, 2018  1:15 PM CDT   Treatment with [facility]   [facility] Radiation Oncology ([facility] )    [address] [address]   [phone]            Jun 04, 2018  1:15 PM CDT   Treatment with [facility]   [facility] Radiation Oncology ([facility] )    [address] [address]   [phone]            Jun 05, 2018  1:15 PM CDT   Treatment with [facility]   [facility] Radiation Oncology ([facility] )    [address] [address]   [phone]            Jun 06, 2018  1:15 PM CDT   Treatment with HI CL[facility] IX   [facility] Radiation Oncology ([facility] )    [address] [address]   [phone]            Jun 07, 2018  1:15 PM CDT   Treatment with HI CL[facility] IX   [facility] Radiation Oncology ([facility] )    [address] [address]   [phone]            Jun 08, 2018  1:15 PM CDT   Treatment with [facility]   [facility] Radiation Oncology ([facility] )    [address] [address]   [phone]            Jun 11, 2018  1:15 PM CDT   Treatment with HI CLIN[facility] IX   HI Radiation Oncology ([facility]  "[facility] )    [address] [address] [phone]            2018  1:15 PM CDT   Treatment with [facility] CL[facility] IX   HI Radiation Oncology ([facility] )    [address] [address] [phone]              Who to contact     If you have questions or need follow up information about today's clinic visit or your schedule please contact [facility] directly at [phone].  Normal or non-critical lab and imaging results will be communicated to you by MyChart, letter or phone within 4 business days after the clinic has received the results. If you do not hear from us within 7 days, please contact the clinic through [employer] or phone. If you have a critical or abnormal lab result, we will notify you by phone as soon as possible.  Submit refill requests through [employer] or call your pharmacy and they will forward the refill request to us. Please allow 3 business days for your refill to be completed.          Additional Information About Your Visit        [employer] Information     [employer] lets you send messages to your doctor, view your test results, renew your prescriptions, schedule appointments and more. To sign up, go to [email or web address] . Click on \"Log in\" on the left side of the screen, which will take you to the Welcome page. Then click on \"Sign up Now\" on the right side of the page.     You will be asked to enter the access code listed below, as well as some personal information. Please follow the directions to create your username and password.     Your access code is: [email or web address]  Expires: 2018  2:55 PM     Your access code will  in 90 days. If you need help or a new code, please call your [place] clinic or [phone].        Care EveryWhere ID     This is your Care EveryWhere ID. This could be used by other organizations to access your [place] medical records  [id number]         Blood Pressure from Last 3 Encounters: "   05/23/18 170/78   05/16/18 144/82   04/20/18 176/82    Weight from Last 3 Encounters:   05/23/18 91.2 kg (201 lb)   05/16/18 93 kg (205 lb)   04/20/18 93.9 kg (207 lb)              Today, you had the following     No orders found for display       Primary Care Provider Office Phone # Fax #    Sabina Flanagan, [phone]       [place] [address] [address] [facility] [address]        Equal Access to Services     [facility]: Hadii charlie ku hadasho Soomaali, waaxda luqadaha, qaybta kaalmada adeegyada, steven victoria . So [facility] [phone].    ATENCIÓN: Si habla español, tiene a bergman disposición servicios gratuitos de asistencia lingüística. LlUniversity Hospitals Health System [phone].    We comply with applicable federal civil rights laws and [place] laws. We do not discriminate on the basis of race, color, national origin, age, disability, sex, sexual orientation, or gender identity.            Thank you!     Thank you for choosing [facility]  for your care. Our goal is always to provide you with excellent care. Hearing back from our patients is one way we can continue to improve our services. Please take a few minutes to complete the written survey that you may receive in the mail after your visit with us. Thank you!             Your Updated Medication List - Protect others around you: Learn how to safely use, store and throw away your medicines at www.disposemymeds.org.          This list is accurate as of 5/29/18  1:19 PM.  Always use your most recent med list.                   Brand Name Dispense Instructions for use Diagnosis    aspirin 325 MG tablet      Take 325 mg by mouth daily        atorvastatin 20 MG tablet    LIPITOR     Take 20 mg by mouth daily        fish Oil 1200 MG capsule      Take 1,200 mg by mouth daily        GAS-X PO      Take 125 mg by mouth 3 times daily        metoprolol succinate 25 MG 24 hr tablet    TOPROL-XL     Take 25 mg by mouth daily

## 2018-05-29 NOTE — PROGRESS NOTES
Steven Lopez received radiation therapy treatment today 05/29/18.    Ever Lr  May 29, 2018  1:18 PM

## 2018-05-30 ENCOUNTER — ALLIED HEALTH/NURSE VISIT (OUTPATIENT)
Dept: RADIATION ONCOLOGY | Facility: HOSPITAL | Age: 73
End: 2018-05-30

## 2018-05-30 ENCOUNTER — OFFICE VISIT (OUTPATIENT)
Dept: RADIATION ONCOLOGY | Facility: HOSPITAL | Age: 73
End: 2018-05-30

## 2018-05-30 VITALS
WEIGHT: 202 LBS | SYSTOLIC BLOOD PRESSURE: 152 MMHG | DIASTOLIC BLOOD PRESSURE: 86 MMHG | HEART RATE: 52 BPM | BODY MASS INDEX: 30.71 KG/M2 | RESPIRATION RATE: 16 BRPM

## 2018-05-30 DIAGNOSIS — C61 MALIGNANT NEOPLASM OF PROSTATE (H): Primary | ICD-10-CM

## 2018-05-30 PROCEDURE — 77336 RADIATION PHYSICS CONSULT: CPT | Performed by: RADIOLOGY

## 2018-05-30 PROCEDURE — 77385 ZZH IMRT TREATMENT DELIVERY, SIMPLE: CPT | Performed by: RADIOLOGY

## 2018-05-30 ASSESSMENT — PAIN SCALES - GENERAL: PAINLEVEL: NO PAIN (0)

## 2018-05-30 NOTE — PROGRESS NOTES
Service Date: 2018      RADIATION THERAPY PROGRESS NOTE       DIAGNOSIS:  Prostate carcinoma, Dorys 3+3 with marked acceleration in PSA dynamics, currently 9.95.       RADIATION RX:  The patient has received 2850 cGy to date for treatment of a histologically low-grade prostate carcinoma but with marked PSA change.       SUBJECTIVE:  Doing very fine without much at all in the way of side effects.       OBJECTIVE:  Weight 201.8 pounds.  Abdomen benign.  Bowel sounds active.       IMPRESSION:  Routine tolerance to radiation therapy for prostate carcinoma.       PLAN:  Continue treatment as planned.         RANULFO TAPIA MD             D: 2018   T: 2018   MT: KAE      Name:     ROLDAN MARQUES   MRN:      [id number]        Account:      [id number]   :      1945           Service Date: 2018      Document: [id number]

## 2018-05-30 NOTE — PROGRESS NOTES
Steven Lopez received radiation therapy treatment today 05/30/18.    Kelly Pantoja  May 30, 2018  1:27 PM

## 2018-05-30 NOTE — MR AVS SNAPSHOT
After Visit Summary   5/30/2018    Steven Lopez    MRN: [id number]           Patient Information     Date Of Birth          1945        Visit Information        Provider Department      5/30/2018 1:15 PM [facility]        Today's Diagnoses     Malignant neoplasm of prostate (H)    -  1       Follow-ups after your visit        Your next 10 appointments already scheduled     May 31, 2018  1:15 PM CDT   Treatment with [facility]   [facility] Rad[facility] Oncology ([facility] )    [address] [address]   [phone]            Jun 01, 2018  1:15 PM CDT   Treatment with [facility]   [facility] On[facility] ([facility] )    [address] [address]   [phone]            Jun 04, 2018  1:15 PM CDT   Treatment with [facility]   [facility] ([facility] )    [address] [address]   [phone]            Jun 05, 2018  1:15 PM CDT   Treatment with [facility]   [facility] ([facility] )    [address] [address]   [phone]            Jun 06, 2018  1:15 PM CDT   Treatment with [facility]   [facility] ([facility] )    [address] [address]   [phone]            Jun 06, 2018  1:30 PM CDT   on treatment visit with Noel Ballesteros MD   [facility] ([facility] )    [address] [address]   [phone]            Jun 07, 2018  1:15 PM CDT   Treatment with [facility]   [facility] ([facility] )    [address] [address]   [phone]            Jun 08, 2018  1:15 PM CDT   Treatment with [facility]   [facility] ([facility] )    [address] [address]   [phone]            Jun 11, 2018  1:15 PM CDT   Treatment with [facility]   HI Radiation  "Oncology ([facility] )    [address] [address] [phone]            2018  1:15 PM CDT   Treatment with [facility]   [facility] Radiation Oncology ([facility] )    [address] [address] [phone]              Who to contact     If you have questions or need follow up information about today's clinic visit or your schedule please contact [facility] directly at [phone].  Normal or non-critical lab and imaging results will be communicated to you by MyChart, letter or phone within 4 business days after the clinic has received the results. If you do not hear from us within 7 days, please contact the clinic through [employer] or phone. If you have a critical or abnormal lab result, we will notify you by phone as soon as possible.  Submit refill requests through [employer] or call your pharmacy and they will forward the refill request to us. Please allow 3 business days for your refill to be completed.          Additional Information About Your Visit        [employer] Information     [employer] lets you send messages to your doctor, view your test results, renew your prescriptions, schedule appointments and more. To sign up, go to [email or web address] . Click on \"Log in\" on the left side of the screen, which will take you to the Welcome page. Then click on \"Sign up Now\" on the right side of the page.     You will be asked to enter the access code listed below, as well as some personal information. Please follow the directions to create your username and password.     Your access code is: [email or web address]  Expires: 2018  2:55 PM     Your access code will  in 90 days. If you need help or a new code, please call your [place] clinic or [phone].        Care EveryWhere ID     This is your Care EveryWhere ID. This could be used by other organizations to access your [place] medical records  [id number]         Blood Pressure from " Last 3 Encounters:   05/23/18 170/78   05/16/18 144/82   04/20/18 176/82    Weight from Last 3 Encounters:   05/23/18 91.2 kg (201 lb)   05/16/18 93 kg (205 lb)   04/20/18 93.9 kg (207 lb)              Today, you had the following     No orders found for display       Primary Care Provider Office Phone # Fax #    Sabina Flaangan, [phone]       [place] [address] [address] [facility] [address]        Equal Access to Services     [facility]: Hadii aad ku hadasho Soomaali, waaxda luqadaha, qaybta kaalmada adeegyada, steven victoria . So [facility] [phone].    ATENCIÓN: Si habla español, tiene a bergman disposición servicios gratuitos de asistencia lingüística. [facility] [phone].    We comply with applicable federal civil rights laws and [place] laws. We do not discriminate on the basis of race, color, national origin, age, disability, sex, sexual orientation, or gender identity.            Thank you!     Thank you for choosing [facility]  for your care. Our goal is always to provide you with excellent care. Hearing back from our patients is one way we can continue to improve our services. Please take a few minutes to complete the written survey that you may receive in the mail after your visit with us. Thank you!             Your Updated Medication List - Protect others around you: Learn how to safely use, store and throw away your medicines at www.disposemymeds.org.          This list is accurate as of 5/30/18  1:41 PM.  Always use your most recent med list.                   Brand Name Dispense Instructions for use Diagnosis    aspirin 325 MG tablet      Take 325 mg by mouth daily        atorvastatin 20 MG tablet    LIPITOR     Take 20 mg by mouth daily        fish Oil 1200 MG capsule      Take 1,200 mg by mouth daily        GAS-X PO      Take 125 mg by mouth 3 times daily        metoprolol succinate 25 MG 24 hr tablet    TOPROL-XL     Take 25  mg by mouth daily

## 2018-05-30 NOTE — MR AVS SNAPSHOT
After Visit Summary   5/30/2018    Steven Lopez    MRN: [id number]           Patient Information     Date Of Birth          1945        Visit Information        Provider Department      5/30/2018 1:30 PM Noel Ballesteros MD [facility]        Today's Diagnoses     Malignant neoplasm of prostate (H)    -  1       Follow-ups after your visit        Your next 10 appointments already scheduled     May 31, 2018  1:15 PM CDT   Treatment with [facility]   [facility] Radiation Oncology ([facility] )    [address] [address]   [phone]            Jun 01, 2018  1:15 PM CDT   Treatment with [facility]   [facility] Radiation Oncology ([facility] )    [address] [address]   [phone]            Jun 04, 2018  1:15 PM CDT   Treatment with [facility]   [facility] Radiation Oncology ([facility] )    [address] [address]   [phone]            Jun 05, 2018  1:15 PM CDT   Treatment with [facility]   [facility] Radiation Oncology ([facility] )    [address] [address]   [phone]            Jun 06, 2018  1:15 PM CDT   Treatment with [facility]   [facility] Rad[facility] Oncology ([facility] )    [address] [address]   [phone]            Jun 06, 2018  1:30 PM CDT   on treatment visit with Noel Ballesteros MD   [facility] Rad[facility] Oncology ([facility] )    [address] [address]   [phone]            Jun 07, 2018  1:15 PM CDT   Treatment with [facility]   [facility] Radiation Oncology ([facility] )    [address] [address]   [phone]            Jun 08, 2018  1:15 PM CDT   Treatment with [facility]   [facility] Radiation Oncology ([facility] )    [address] [address]   [phone]            Jun 11, 2018  1:15 PM CDT   Treatment with [facility]   HI Radiation  "Oncology ([facility] )    [address] [address] [phone]            2018  1:15 PM CDT   Treatment with [facility]   [facility] Radiation Oncology ([facility] )    [address] [address] [phone]              Who to contact     If you have questions or need follow up information about today's clinic visit or your schedule please contact [facility] directly at [phone].  Normal or non-critical lab and imaging results will be communicated to you by MyChart, letter or phone within 4 business days after the clinic has received the results. If you do not hear from us within 7 days, please contact the clinic through [employer] or phone. If you have a critical or abnormal lab result, we will notify you by phone as soon as possible.  Submit refill requests through [employer] or call your pharmacy and they will forward the refill request to us. Please allow 3 business days for your refill to be completed.          Additional Information About Your Visit        [employer] Information     [employer] lets you send messages to your doctor, view your test results, renew your prescriptions, schedule appointments and more. To sign up, go to [email or web address] . Click on \"Log in\" on the left side of the screen, which will take you to the Welcome page. Then click on \"Sign up Now\" on the right side of the page.     You will be asked to enter the access code listed below, as well as some personal information. Please follow the directions to create your username and password.     Your access code is: [email or web address]  Expires: 2018  2:55 PM     Your access code will  in 90 days. If you need help or a new code, please call your [place] clinic or [phone].        Care EveryWhere ID     This is your Care EveryWhere ID. This could be used by other organizations to access your [place] medical records  [id number]        Your Vitals Were     " Pulse Respirations BMI (Body Mass Index)             52 16 30.71 kg/m2          Blood Pressure from Last 3 Encounters:   05/30/18 152/86   05/23/18 170/78   05/16/18 144/82    Weight from Last 3 Encounters:   05/30/18 91.6 kg (202 lb)   05/23/18 91.2 kg (201 lb)   05/16/18 93 kg (205 lb)              Today, you had the following     No orders found for display       Primary Care Provider Office Phone # Fax #    Sabina Panchito, [phone]       [place] [address] [facility] [address]        Equal Access to Services     [facility]: Hadii charlie Manzano, wasarah stark, qaybta kaalmada michelle, steven victoria . So [facility] [phone].    ATENCIÓN: Si habla español, tiene a bergman disposición servicios gratuitos de asistencia lingüística. [facility] [phone].    We comply with applicable federal civil rights laws and [place] laws. We do not discriminate on the basis of race, color, national origin, age, disability, sex, sexual orientation, or gender identity.            Thank you!     Thank you for choosing [facility]  for your care. Our goal is always to provide you with excellent care. Hearing back from our patients is one way we can continue to improve our services. Please take a few minutes to complete the written survey that you may receive in the mail after your visit with us. Thank you!             Your Updated Medication List - Protect others around you: Learn how to safely use, store and throw away your medicines at www.disposemymeds.org.          This list is accurate as of 5/30/18  3:03 PM.  Always use your most recent med list.                   Brand Name Dispense Instructions for use Diagnosis    aspirin 325 MG tablet      Take 325 mg by mouth daily        atorvastatin 20 MG tablet    LIPITOR     Take 20 mg by mouth daily        fish Oil 1200 MG capsule      Take 1,200 mg by mouth daily        GAS-X PO      Take 125  mg by mouth 3 times daily        metoprolol succinate 25 MG 24 hr tablet    TOPROL-XL     Take 25 mg by mouth daily

## 2018-05-31 ENCOUNTER — ALLIED HEALTH/NURSE VISIT (OUTPATIENT)
Dept: RADIATION ONCOLOGY | Facility: HOSPITAL | Age: 73
End: 2018-05-31

## 2018-05-31 DIAGNOSIS — C61 MALIGNANT NEOPLASM OF PROSTATE (H): Primary | ICD-10-CM

## 2018-05-31 PROCEDURE — 77385 ZZH IMRT TREATMENT DELIVERY, SIMPLE: CPT | Performed by: RADIOLOGY

## 2018-05-31 NOTE — MR AVS SNAPSHOT
After Visit Summary   5/31/2018    Steven Lopez    MRN: [id number]           Patient Information     Date Of Birth          1945        Visit Information        Provider Department      5/31/2018 1:15 PM [facility]        Today's Diagnoses     Malignant neoplasm of prostate (H)    -  1       Follow-ups after your visit        Your next 10 appointments already scheduled     May 31, 2018  1:15 PM CDT   Treatment with [facility]   [facility] Rad[facility] Oncology ([facility] )    [address] [address]   [phone]            Jun 01, 2018  1:15 PM CDT   Treatment with [facility]   [facility] Rad[facility] Oncology ([facility] )    [address] [address]   [phone]            Jun 04, 2018  1:15 PM CDT   Treatment with [facility]   [facility] Radiation Oncology ([facility] )    [address] [address]   [phone]            Jun 05, 2018  1:15 PM CDT   Treatment with [facility]   [facility] Rad[facility] Oncology ([facility] )    [address] [address]   [phone]            Jun 06, 2018  1:15 PM CDT   Treatment with [facility]   [facility] Rad[facility] Oncology ([facility] )    [address] [address]   [phone]            Jun 06, 2018  1:30 PM CDT   on treatment visit with Noel Ballesteros MD   [facility] ([facility] )    [address] [address]   [phone]            Jun 07, 2018  1:15 PM CDT   Treatment with [facility]   [facility]cology ([facility] )    [address] [address]   [phone]            Jun 08, 2018  1:15 PM CDT   Treatment with [facility]   [facility] Radiation Oncology ([facility] )    [address] [address]   [phone]            Jun 11, 2018  1:15 PM CDT   Treatment with [facility]   HI Radiation  "Oncology ([facility] )    [address] [address] [phone]            2018  1:15 PM CDT   Treatment with [facility]   [facility] Radiation Oncology ([facility] )    [address] [address] [phone]              Who to contact     If you have questions or need follow up information about today's clinic visit or your schedule please contact [facility] directly at [phone].  Normal or non-critical lab and imaging results will be communicated to you by MyChart, letter or phone within 4 business days after the clinic has received the results. If you do not hear from us within 7 days, please contact the clinic through [employer] or phone. If you have a critical or abnormal lab result, we will notify you by phone as soon as possible.  Submit refill requests through [employer] or call your pharmacy and they will forward the refill request to us. Please allow 3 business days for your refill to be completed.          Additional Information About Your Visit        [employer] Information     [employer] lets you send messages to your doctor, view your test results, renew your prescriptions, schedule appointments and more. To sign up, go to [email or web address] . Click on \"Log in\" on the left side of the screen, which will take you to the Welcome page. Then click on \"Sign up Now\" on the right side of the page.     You will be asked to enter the access code listed below, as well as some personal information. Please follow the directions to create your username and password.     Your access code is: [email or web address]  Expires: 2018  2:55 PM     Your access code will  in 90 days. If you need help or a new code, please call your [place] clinic or [phone].        Care EveryWhere ID     This is your Care EveryWhere ID. This could be used by other organizations to access your [place] medical records  [id number]         Blood Pressure from " Last 3 Encounters:   05/30/18 152/86   05/23/18 170/78   05/16/18 144/82    Weight from Last 3 Encounters:   05/30/18 91.6 kg (202 lb)   05/23/18 91.2 kg (201 lb)   05/16/18 93 kg (205 lb)              Today, you had the following     No orders found for display       Primary Care Provider Office Phone # Fax #    Sabina Flanagan, [phone]       [place] [address] [address] [facility] [address]        Equal Access to Services     : Hadii aad ku hadasho Soomaali, waaxda luqadaha, qaybta kaalmada adeegyada, steven victoria . So [facility] [phone].    ATENCIÓN: Si habla español, tiene a bergman disposición servicios gratuitos de asistencia lingüística. [facility] [phone].    We comply with applicable federal civil rights laws and [place] laws. We do not discriminate on the basis of race, color, national origin, age, disability, sex, sexual orientation, or gender identity.            Thank you!     Thank you for choosing [facility]  for your care. Our goal is always to provide you with excellent care. Hearing back from our patients is one way we can continue to improve our services. Please take a few minutes to complete the written survey that you may receive in the mail after your visit with us. Thank you!             Your Updated Medication List - Protect others around you: Learn how to safely use, store and throw away your medicines at www.disposemymeds.org.          This list is accurate as of 5/31/18  1:13 PM.  Always use your most recent med list.                   Brand Name Dispense Instructions for use Diagnosis    aspirin 325 MG tablet      Take 325 mg by mouth daily        atorvastatin 20 MG tablet    LIPITOR     Take 20 mg by mouth daily        fish Oil 1200 MG capsule      Take 1,200 mg by mouth daily        GAS-X PO      Take 125 mg by mouth 3 times daily        metoprolol succinate 25 MG 24 hr tablet    TOPROL-XL     Take 25  mg by mouth daily

## 2018-05-31 NOTE — PROGRESS NOTES
Steven Lopez received radiation therapy treatment today 05/31/18.    Ever Lr  May 31, 2018  1:10 PM

## 2018-06-01 ENCOUNTER — ALLIED HEALTH/NURSE VISIT (OUTPATIENT)
Dept: RADIATION ONCOLOGY | Facility: HOSPITAL | Age: 73
End: 2018-06-01
Attending: RADIOLOGY
Payer: MEDICARE

## 2018-06-01 DIAGNOSIS — C61 MALIGNANT NEOPLASM OF PROSTATE (H): Primary | ICD-10-CM

## 2018-06-01 PROCEDURE — 77385 ZZH IMRT TREATMENT DELIVERY, SIMPLE: CPT | Performed by: RADIOLOGY

## 2018-06-01 NOTE — PROGRESS NOTES
Steven Lopez received radiation therapy treatment today 06/01/18.    Trey Leyva  June 1, 2018  1:13 PM

## 2018-06-01 NOTE — MR AVS SNAPSHOT
After Visit Summary   6/1/2018    Steven Lopez    MRN: [id number]           Patient Information     Date Of Birth          1945        Visit Information        Provider Department      6/1/2018 1:15 PM [facility]        Today's Diagnoses     Malignant neoplasm of prostate (H)    -  1       Follow-ups after your visit        Your next 10 appointments already scheduled     Jun 04, 2018  1:15 PM CDT   Treatment with [facility]   [facility] ([facility] )    [address] [address]   [phone]            Jun 05, 2018  1:15 PM CDT   Treatment with [facility]   [facility] ([facility] )    [address] [address] [phone]            Jun 06, 2018  1:15 PM CDT   Treatment with [facility]   [facility] ([facility] )    [address] [address]   [phone]            Jun 06, 2018  1:30 PM CDT   on treatment visit with Noel Ballesteros MD   [facility] ([facility] )    [address] [address]   [phone]            Jun 07, 2018  1:15 PM CDT   Treatment with [facility]   [facility] ([facility] )    [address] [address]   [phone]            Jun 08, 2018  1:15 PM CDT   Treatment with [facility]   [facility] ([facility] )    [address] [address]   [phone]            Jun 11, 2018  1:15 PM CDT   Treatment with [facility]   [facility] ([facility] )    [address] [address] [phone]            Jun 12, 2018  1:15 PM CDT   Treatment with [facility]   [facility]cology ([facility] )    [address] [address] [phone]            Jun 13, 2018  1:15 PM CDT   Treatment with [facility]   [facility] Radiation Oncology  "([facility] )    [address] [address] [phone]            2018  1:30 PM CDT   on treatment visit with Noel Ballesteros MD   [facility] Radiation Oncology ([facility] )    [address] [address] [phone]              Who to contact     If you have questions or need follow up information about today's clinic visit or your schedule please contact [facility] directly at [phone].  Normal or non-critical lab and imaging results will be communicated to you by MyChart, letter or phone within 4 business days after the clinic has received the results. If you do not hear from us within 7 days, please contact the clinic through [employer] or phone. If you have a critical or abnormal lab result, we will notify you by phone as soon as possible.  Submit refill requests through [employer] or call your pharmacy and they will forward the refill request to us. Please allow 3 business days for your refill to be completed.          Additional Information About Your Visit        [employer] Information     [employer] lets you send messages to your doctor, view your test results, renew your prescriptions, schedule appointments and more. To sign up, go to [email or web address] . Click on \"Log in\" on the left side of the screen, which will take you to the Welcome page. Then click on \"Sign up Now\" on the right side of the page.     You will be asked to enter the access code listed below, as well as some personal information. Please follow the directions to create your username and password.     Your access code is: [email or web address]  Expires: 2018  2:55 PM     Your access code will  in 90 days. If you need help or a new code, please call your [place] clinic or [phone].        Care EveryWhere ID     This is your Care EveryWhere ID. This could be used by other organizations to access your [place] medical records  [id number]         Blood " Pressure from Last 3 Encounters:   05/30/18 152/86   05/23/18 170/78   05/16/18 144/82    Weight from Last 3 Encounters:   05/30/18 91.6 kg (202 lb)   05/23/18 91.2 kg (201 lb)   05/16/18 93 kg (205 lb)              Today, you had the following     No orders found for display       Primary Care Provider Office Phone # Fax #    Sabina Flanagan, [phone]       [place] [address] [address] [facility] [address]        Equal Access to Services     [facility]: Hadii charlie ku hadasho Soomaali, waaxda luqadaha, qaybta kaalmada adeegyaabel, steven victoria . So [facility] [phone].    ATENCIÓN: Si habla español, tiene a bergman disposición servicios gratuitos de asistencia lingüística. [facility] [phone].    We comply with applicable federal civil rights laws and [place] laws. We do not discriminate on the basis of race, color, national origin, age, disability, sex, sexual orientation, or gender identity.            Thank you!     Thank you for choosing [facility]  for your care. Our goal is always to provide you with excellent care. Hearing back from our patients is one way we can continue to improve our services. Please take a few minutes to complete the written survey that you may receive in the mail after your visit with us. Thank you!             Your Updated Medication List - Protect others around you: Learn how to safely use, store and throw away your medicines at www.disposemymeds.org.          This list is accurate as of 6/1/18  1:28 PM.  Always use your most recent med list.                   Brand Name Dispense Instructions for use Diagnosis    aspirin 325 MG tablet      Take 325 mg by mouth daily        atorvastatin 20 MG tablet    LIPITOR     Take 20 mg by mouth daily        fish Oil 1200 MG capsule      Take 1,200 mg by mouth daily        GAS-X PO      Take 125 mg by mouth 3 times daily        metoprolol succinate 25 MG 24 hr tablet    TOPROL-XL      Take 25 mg by mouth daily

## 2018-06-04 ENCOUNTER — ALLIED HEALTH/NURSE VISIT (OUTPATIENT)
Dept: RADIATION ONCOLOGY | Facility: HOSPITAL | Age: 73
End: 2018-06-04

## 2018-06-04 DIAGNOSIS — C61 MALIGNANT NEOPLASM OF PROSTATE (H): Primary | ICD-10-CM

## 2018-06-04 PROCEDURE — 77385 ZZH IMRT TREATMENT DELIVERY, SIMPLE: CPT | Performed by: RADIOLOGY

## 2018-06-04 NOTE — PROGRESS NOTES
Steven Lopez received radiation therapy treatment today 06/04/18.    Kelly Pantoja  June 4, 2018  1:12 PM

## 2018-06-04 NOTE — MR AVS SNAPSHOT
After Visit Summary   6/4/2018    Steven Lopez    MRN: [id number]           Patient Information     Date Of Birth          1945        Visit Information        Provider Department      6/4/2018 1:15 PM [facility]        Today's Diagnoses     Malignant neoplasm of prostate (H)    -  1       Follow-ups after your visit        Your next 10 appointments already scheduled     Jun 05, 2018  1:15 PM CDT   Treatment with [facility]   [facility] Radiation Oncology ([facility] )    [address] [address]   [phone]            Jun 06, 2018  1:15 PM CDT   Treatment with [facility]   [facility] Rad[facility]cology ([facility] )    [address] [address]   [phone]            Jun 06, 2018  1:30 PM CDT   on treatment visit with Noel Ballesteros MD   [facility] ([facility] )    [address] [address]   [phone]            Jun 07, 2018  1:15 PM CDT   Treatment with [facility]   [facility] Rad[facility]cology ([facility] )    [address] [address]   [phone]            Jun 08, 2018  1:15 PM CDT   Treatment with [facility]   [facility] ([facility] )    [address] [address]   [phone]            Jun 11, 2018  1:15 PM CDT   Treatment with [facility]   [facility]cology ([facility] )    [address] [address]   [phone]            Jun 12, 2018  1:15 PM CDT   Treatment with [facility]   [facility]cology ([facility] )    [address] [address]   [phone]            Jun 13, 2018  1:15 PM CDT   Treatment with [facility]   [facility] ([facility] )    [address] [address]   [phone]            Jun 13, 2018  1:30 PM CDT   on treatment visit with Noel Ballesteros MD   HI  "Radiation Oncology ([facility] )    [address] [address] [phone]            2018  1:15 PM CDT   Treatment with [facility]   [facility] Radiation Oncology ([facility] )    [address] [address] [phone]              Who to contact     If you have questions or need follow up information about today's clinic visit or your schedule please contact [facility] directly at [phone].  Normal or non-critical lab and imaging results will be communicated to you by [employer], letter or phone within 4 business days after the clinic has received the results. If you do not hear from us within 7 days, please contact the clinic through [employer] or phone. If you have a critical or abnormal lab result, we will notify you by phone as soon as possible.  Submit refill requests through [employer] or call your pharmacy and they will forward the refill request to us. Please allow 3 business days for your refill to be completed.          Additional Information About Your Visit        [employer] Information     [employer] lets you send messages to your doctor, view your test results, renew your prescriptions, schedule appointments and more. To sign up, go to [email or web address] . Click on \"Log in\" on the left side of the screen, which will take you to the Welcome page. Then click on \"Sign up Now\" on the right side of the page.     You will be asked to enter the access code listed below, as well as some personal information. Please follow the directions to create your username and password.     Your access code is: [email or web address]  Expires: 2018  2:55 PM     Your access code will  in 90 days. If you need help or a new code, please call your [place] clinic or [phone].        Care EveryWhere ID     This is your Care EveryWhere ID. This could be used by other organizations to access your [place] medical records  [id number]         Blood " Pressure from Last 3 Encounters:   05/30/18 152/86   05/23/18 170/78   05/16/18 144/82    Weight from Last 3 Encounters:   05/30/18 91.6 kg (202 lb)   05/23/18 91.2 kg (201 lb)   05/16/18 93 kg (205 lb)              Today, you had the following     No orders found for display       Primary Care Provider Office Phone # Fax #    Sabina Flanagan, [phone]       [place] [address] [address] [facility] [address]        Equal Access to Services     [facility]: Hadii charlie ku hadasho Soomaali, waaxda luqadaha, qaybta kaalmada adeegyaabel, steven victoria . So [facility] [phone].    ATENCIÓN: Si habla español, tiene a bergman disposición servicios gratuitos de asistencia lingüística. [facility] [phone].    We comply with applicable federal civil rights laws and [place] laws. We do not discriminate on the basis of race, color, national origin, age, disability, sex, sexual orientation, or gender identity.            Thank you!     Thank you for choosing [facility]  for your care. Our goal is always to provide you with excellent care. Hearing back from our patients is one way we can continue to improve our services. Please take a few minutes to complete the written survey that you may receive in the mail after your visit with us. Thank you!             Your Updated Medication List - Protect others around you: Learn how to safely use, store and throw away your medicines at www.disposemymeds.org.          This list is accurate as of 6/4/18  1:28 PM.  Always use your most recent med list.                   Brand Name Dispense Instructions for use Diagnosis    aspirin 325 MG tablet      Take 325 mg by mouth daily        atorvastatin 20 MG tablet    LIPITOR     Take 20 mg by mouth daily        fish Oil 1200 MG capsule      Take 1,200 mg by mouth daily        GAS-X PO      Take 125 mg by mouth 3 times daily        metoprolol succinate 25 MG 24 hr tablet    TOPROL-XL      Take 25 mg by mouth daily

## 2018-06-05 ENCOUNTER — ALLIED HEALTH/NURSE VISIT (OUTPATIENT)
Dept: RADIATION ONCOLOGY | Facility: HOSPITAL | Age: 73
End: 2018-06-05

## 2018-06-05 DIAGNOSIS — C61 PROSTATE CANCER (H): Primary | ICD-10-CM

## 2018-06-05 PROCEDURE — 77385 ZZH IMRT TREATMENT DELIVERY, SIMPLE: CPT | Performed by: RADIOLOGY

## 2018-06-05 NOTE — MR AVS SNAPSHOT
After Visit Summary   6/5/2018    Steven Lopez    MRN: [id number]           Patient Information     Date Of Birth          1945        Visit Information        Provider Department      6/5/2018 1:15 PM [facility]        Today's Diagnoses     Prostate cancer (H)    -  1       Follow-ups after your visit        Your next 10 appointments already scheduled     Jun 06, 2018  1:15 PM CDT   Treatment with [facility]   [facility] ([facility] )    [address] [address] [phone]            Jun 06, 2018  1:30 PM CDT   on treatment visit with Noel Ballesteros MD   [facility] ([facility] )    [address] [address] [phone]            Jun 07, 2018  1:15 PM CDT   Treatment with [facility]   [facility] ([facility] )    [address] [address] [phone]            Jun 08, 2018  1:15 PM CDT   Treatment with [facility]   [facility] Rad[facility]cology ([facility] )    [address] [address] [phone]            Jun 11, 2018  1:15 PM CDT   Treatment with [facility]   [facility] ([facility] )    [address] [address]   [phone]            Jun 12, 2018  1:15 PM CDT   Treatment with [facility]   [facility] ([facility] )    [address] [address] [phone]            Jun 13, 2018  1:15 PM CDT   Treatment with [facility]   [facility]cology ([facility] )    [address] [address] [phone]            Jun 13, 2018  1:30 PM CDT   on treatment visit with Noel Ballesteros MD   [facility] ([facility] )    [address] [id number] [phone]            Jun 14, 2018  1:15 PM CDT   Treatment with [facility]   [facility] Radiation Oncology  "([facility] )    [address] [address] [phone]            Dmitri 15, 2018  1:15 PM CDT   Treatment with [facility]   [facility] Radiation Oncology ([facility] )    [address] [address] [phone]              Who to contact     If you have questions or need follow up information about today's clinic visit or your schedule please contact [facility] directly at [phone].  Normal or non-critical lab and imaging results will be communicated to you by MyChart, letter or phone within 4 business days after the clinic has received the results. If you do not hear from us within 7 days, please contact the clinic through [employer] or phone. If you have a critical or abnormal lab result, we will notify you by phone as soon as possible.  Submit refill requests through [employer] or call your pharmacy and they will forward the refill request to us. Please allow 3 business days for your refill to be completed.          Additional Information About Your Visit        [employer] Information     [employer] lets you send messages to your doctor, view your test results, renew your prescriptions, schedule appointments and more. To sign up, go to [email or web address] . Click on \"Log in\" on the left side of the screen, which will take you to the Welcome page. Then click on \"Sign up Now\" on the right side of the page.     You will be asked to enter the access code listed below, as well as some personal information. Please follow the directions to create your username and password.     Your access code is: [email or web address]  Expires: 2018  2:55 PM     Your access code will  in 90 days. If you need help or a new code, please call your [place] clinic or [phone].        Care EveryWhere ID     This is your Care EveryWhere ID. This could be used by other organizations to access your [place] medical records  [id number]         Blood Pressure from Last 3 " Encounters:   05/30/18 152/86   05/23/18 170/78   05/16/18 144/82    Weight from Last 3 Encounters:   05/30/18 91.6 kg (202 lb)   05/23/18 91.2 kg (201 lb)   05/16/18 93 kg (205 lb)              Today, you had the following     No orders found for display       Primary Care Provider Office Phone # Fax #    Sabina Flanagan, [phone]       [place] [address] [address] [facility] [address]        Equal Access to Services     [facility]: Hadii aad ku hadasho Soomaali, waaxda luqadaha, qaybta kaalmada adeegyada, steven victoria . So [facility] [phone].    ATENCIÓN: Si habla español, tiene a bergman disposición servicios gratuitos de asistencia lingüística. [facility] [phone].    We comply with applicable federal civil rights laws and [place] laws. We do not discriminate on the basis of race, color, national origin, age, disability, sex, sexual orientation, or gender identity.            Thank you!     Thank you for choosing [facility]  for your care. Our goal is always to provide you with excellent care. Hearing back from our patients is one way we can continue to improve our services. Please take a few minutes to complete the written survey that you may receive in the mail after your visit with us. Thank you!             Your Updated Medication List - Protect others around you: Learn how to safely use, store and throw away your medicines at www.disposemymeds.org.          This list is accurate as of 6/5/18  1:26 PM.  Always use your most recent med list.                   Brand Name Dispense Instructions for use Diagnosis    aspirin 325 MG tablet      Take 325 mg by mouth daily        atorvastatin 20 MG tablet    LIPITOR     Take 20 mg by mouth daily        fish Oil 1200 MG capsule      Take 1,200 mg by mouth daily        GAS-X PO      Take 125 mg by mouth 3 times daily        metoprolol succinate 25 MG 24 hr tablet    TOPROL-XL     Take 25 mg by  mouth daily

## 2018-06-05 NOTE — PROGRESS NOTES
Steven Lopez received radiation therapy treatment today 06/05/18.    Ever rL  June 5, 2018  1:23 PM

## 2018-06-06 ENCOUNTER — OFFICE VISIT (OUTPATIENT)
Dept: RADIATION ONCOLOGY | Facility: HOSPITAL | Age: 73
End: 2018-06-06
Attending: RADIOLOGY
Payer: MEDICARE

## 2018-06-06 ENCOUNTER — ALLIED HEALTH/NURSE VISIT (OUTPATIENT)
Dept: RADIATION ONCOLOGY | Facility: HOSPITAL | Age: 73
End: 2018-06-06

## 2018-06-06 VITALS
RESPIRATION RATE: 16 BRPM | HEART RATE: 56 BPM | SYSTOLIC BLOOD PRESSURE: 130 MMHG | BODY MASS INDEX: 30.87 KG/M2 | WEIGHT: 203 LBS | DIASTOLIC BLOOD PRESSURE: 84 MMHG

## 2018-06-06 DIAGNOSIS — C61 PROSTATE CANCER (H): Primary | ICD-10-CM

## 2018-06-06 PROCEDURE — 77385 ZZH IMRT TREATMENT DELIVERY, SIMPLE: CPT | Performed by: RADIOLOGY

## 2018-06-06 PROCEDURE — 77336 RADIATION PHYSICS CONSULT: CPT | Performed by: RADIOLOGY

## 2018-06-06 ASSESSMENT — PAIN SCALES - GENERAL: PAINLEVEL: NO PAIN (0)

## 2018-06-06 NOTE — MR AVS SNAPSHOT
After Visit Summary   6/6/2018    Steven Lopez    MRN: [id number]           Patient Information     Date Of Birth          1945        Visit Information        Provider Department      6/6/2018 1:30 PM Noel Ballesteros MD [facility]        Today's Diagnoses     Prostate cancer (H)    -  1       Follow-ups after your visit        Your next 10 appointments already scheduled     Jun 07, 2018  1:15 PM CDT   Treatment with [facility]   [facility] ([facility] )    [address] [id number] [phone]            Jun 08, 2018  1:15 PM CDT   Treatment with [facility]   [facility] ([facility] )    [address] [address] [phone]            Jun 11, 2018  1:15 PM CDT   Treatment with [facility]   [facility] ([facility] )    [address] [id number] [phone]            Jun 12, 2018  1:15 PM CDT   Treatment with [facility]   [facility] ([facility] )    [address] [address] [phone]            Jun 13, 2018  1:15 PM CDT   Treatment with [facility]   [facility] ([facility] )    [address] [address] [phone]            Jun 13, 2018  1:30 PM CDT   on treatment visit with Noel Ballesteros MD   [facility] ([facility] )    [address] [address] [phone]            Jun 14, 2018  1:15 PM CDT   Treatment with [facility]   [facility] ([facility] )    [address] [address] [phone]            Dmitri 15, 2018  1:15 PM CDT   Treatment with [facility]   [facility] ([facility] )    [address] [id number] [phone]            Jun 18, 2018  1:15 PM CDT   Treatment with [facility]   [facility] ([place]  [facility] )    [address] [address] [phone]            2018  1:15 PM CDT   Treatment with [facility]   [facility] ([facility] )    [address] [address] [phone]              Who to contact     If you have questions or need follow up information about today's clinic visit or your schedule please contact [facility] directly at [phone].  Normal or non-critical lab and imaging results will be communicated to you by MyChart, letter or phone within 4 business days after the clinic has received the results. If you do not hear from us within 7 days, please contact the clinic through [employer] or phone. If you have a critical or abnormal lab result, we will notify you by phone as soon as possible.  Submit refill requests through [facility] or call your pharmacy and they will forward the refill request to us. Please allow 3 business days for your refill to be completed.          Additional Information About Your Visit        [employer] Information     [facility] lets you send messages to your doctor, view your test results, renew your prescriptions, schedule appointments and more. To sign up, go to [email or web address] . Click on \"Log in\" on the left side of the screen, which will take you to the Welcome page. Then click on \"Sign up Now\" on the right side of the page.     You will be asked to enter the access code listed below, as well as some personal information. Please follow the directions to create your username and password.     Your access code is: [email or web address]  Expires: 2018  2:55 PM     Your access code will  in 90 days. If you need help or a new code, please call your [place] clinic or [phone].        Care EveryWhere ID     This is your Care EveryWhere ID. This could be used by other organizations to access your [place] medical records  [id number]        Your Vitals Were     Pulse " Respirations BMI (Body Mass Index)             56 16 30.87 kg/m2          Blood Pressure from Last 3 Encounters:   06/06/18 130/84   05/30/18 152/86   05/23/18 170/78    Weight from Last 3 Encounters:   06/06/18 92.1 kg (203 lb)   05/30/18 91.6 kg (202 lb)   05/23/18 91.2 kg (201 lb)              Today, you had the following     No orders found for display       Primary Care Provider Office Phone # Fax #    Sabina KENDAL Flanagan [phone]       [place] [address] [facility] [address]        Equal Access to Services     [facility]: Hadii charlie Manzano, wasarah stark, qaybta kaalmada michelle, steven victoria . So [facility] [phone].    ATENCIÓN: Si habla español, tiene a bergman disposición servicios gratuitos de asistencia lingüística. Ll[facility] [phone].    We comply with applicable federal civil rights laws and [place] laws. We do not discriminate on the basis of race, color, national origin, age, disability, sex, sexual orientation, or gender identity.            Thank you!     Thank you for choosing [facility]  for your care. Our goal is always to provide you with excellent care. Hearing back from our patients is one way we can continue to improve our services. Please take a few minutes to complete the written survey that you may receive in the mail after your visit with us. Thank you!             Your Updated Medication List - Protect others around you: Learn how to safely use, store and throw away your medicines at www.disposemymeds.org.          This list is accurate as of 6/6/18  3:15 PM.  Always use your most recent med list.                   Brand Name Dispense Instructions for use Diagnosis    aspirin 325 MG tablet      Take 325 mg by mouth daily        atorvastatin 20 MG tablet    LIPITOR     Take 20 mg by mouth daily        fish Oil 1200 MG capsule      Take 1,200 mg by mouth daily        GAS-X PO      Take 125 mg  by mouth 3 times daily        metoprolol succinate 25 MG 24 hr tablet    TOPROL-XL     Take 25 mg by mouth daily

## 2018-06-06 NOTE — PROGRESS NOTES
Service Date: 2018      REVISED REPORT      RADIATION THERAPY PROGRESS NOTE      REFERRING PHYSICIAN:  Jaiden Almodovar MD, and Daniel Matthews DO.       REFERRING NONPHYSICIAN PROVIDER:  Sabina Flanagan DNP       DIAGNOSIS:  Prostate carcinoma, Charlton 3+3 with marked acceleration of PSA dynamics currently 9.95.         RADIATION RX:  The patient has received 3800 cGy to date for treatment of a histologically low-grade prostate carcinoma but with a marked increase in PSA.         SUBJECTIVE:  Doing very well without too much in the way of side effects.  He is having softer stools, bordering on diarrhea several times a day.  Also has a bit of terminal dysuria but nothing too troublesome.       OBJECTIVE:  Weight 202.8 pounds.  Abdomen is benign.  Bowel sounds active.       IMPRESSION:  Routine tolerance to radiation therapy for prostate carcinoma.       PLAN:  Continue treatment as planned         RANULFO TAPIA MD             D: 2018   T: 2018   MT: AMISH      Name:     ROLDAN MARQUES   MRN:      [id number]        Account:      [id number]   :      1945           Service Date: 2018      Document: [id number]       cc: Sabina Matthews DO

## 2018-06-06 NOTE — MR AVS SNAPSHOT
After Visit Summary   6/6/2018    Steven Lopez    MRN: [id number]           Patient Information     Date Of Birth          1945        Visit Information        Provider Department      6/6/2018 1:15 PM [facility]        Today's Diagnoses     Prostate cancer (H)    -  1       Follow-ups after your visit        Your next 10 appointments already scheduled     Jun 06, 2018  1:30 PM CDT   on treatment visit with Noel Ballesteros MD   [facility] ([facility] )    [address] [phone] [phone]            Jun 07, 2018  1:15 PM CDT   Treatment with [facility]   [facility] ([facility] )    [address] [address] [phone]            Jun 08, 2018  1:15 PM CDT   Treatment with [facility]   [facility] ([facility] )    [address] [id number] [phone]            Jun 11, 2018  1:15 PM CDT   Treatment with [facility]   [facility] ([facility] )    [address] [address] [phone]            Jun 12, 2018  1:15 PM CDT   Treatment with [facility]   [facility] ([facility] )    [address] [address]   [phone]            Jun 13, 2018  1:15 PM CDT   Treatment with [facility]   [facility] ([facility] )    [address] [address] [phone]            Jun 13, 2018  1:30 PM CDT   on treatment visit with Noel Ballesteros MD   [facility] ([facility] )    [address] [phone] [phone]            Jun 14, 2018  1:15 PM CDT   Treatment with [facility]   [facility] ([facility] )    [address] [address] [phone]            Dmitri 15, 2018  1:15 PM CDT   Treatment with [facility]   [facility]  "([facility] )    [address] [address] [phone]            2018  1:15 PM CDT   Treatment with [facility]   [facility] ([facility] )    [address] [address] [phone]              Who to contact     If you have questions or need follow up information about today's clinic visit or your schedule please contact [facility] directly at [phone].  Normal or non-critical lab and imaging results will be communicated to you by MyChart, letter or phone within 4 business days after the clinic has received the results. If you do not hear from us within 7 days, please contact the clinic through [employer] or phone. If you have a critical or abnormal lab result, we will notify you by phone as soon as possible.  Submit refill requests through [employer] or call your pharmacy and they will forward the refill request to us. Please allow 3 business days for your refill to be completed.          Additional Information About Your Visit        [employer] Information     [employer] lets you send messages to your doctor, view your test results, renew your prescriptions, schedule appointments and more. To sign up, go to [email or web address] . Click on \"Log in\" on the left side of the screen, which will take you to the Welcome page. Then click on \"Sign up Now\" on the right side of the page.     You will be asked to enter the access code listed below, as well as some personal information. Please follow the directions to create your username and password.     Your access code is: [email or web address]  Expires: 2018  2:55 PM     Your access code will  in 90 days. If you need help or a new code, please call your [place] clinic or [phone].        Care EveryWhere ID     This is your Care EveryWhere ID. This could be used by other organizations to access your [place] medical records  [id number]         Blood Pressure from Last 3 " Encounters:   05/30/18 152/86   05/23/18 170/78   05/16/18 144/82    Weight from Last 3 Encounters:   05/30/18 91.6 kg (202 lb)   05/23/18 91.2 kg (201 lb)   05/16/18 93 kg (205 lb)              Today, you had the following     No orders found for display       Primary Care Provider Office Phone # Fax #    Sabina Flanagan, [phone]       [place] [address] [address] [facility] [address]        Equal Access to Services     [facility]: Hadii aad ku hadasho Soomaali, waaxda luqadaha, qaybta kaalmada adeegyada, steven victoria . So [facility] [phone].    ATENCIÓN: Si habla español, tiene a bergman disposición servicios gratuitos de asistencia lingüística. Ll[facility] [phone].    We comply with applicable federal civil rights laws and [place] laws. We do not discriminate on the basis of race, color, national origin, age, disability, sex, sexual orientation, or gender identity.            Thank you!     Thank you for choosing [facility]  for your care. Our goal is always to provide you with excellent care. Hearing back from our patients is one way we can continue to improve our services. Please take a few minutes to complete the written survey that you may receive in the mail after your visit with us. Thank you!             Your Updated Medication List - Protect others around you: Learn how to safely use, store and throw away your medicines at www.disposemymeds.org.          This list is accurate as of 6/6/18  1:18 PM.  Always use your most recent med list.                   Brand Name Dispense Instructions for use Diagnosis    aspirin 325 MG tablet      Take 325 mg by mouth daily        atorvastatin 20 MG tablet    LIPITOR     Take 20 mg by mouth daily        fish Oil 1200 MG capsule      Take 1,200 mg by mouth daily        GAS-X PO      Take 125 mg by mouth 3 times daily        metoprolol succinate 25 MG 24 hr tablet    TOPROL-XL     Take 25 mg by  mouth daily

## 2018-06-06 NOTE — PROGRESS NOTES
Steven Lopez received radiation therapy treatment today 06/06/18.    Trey Leyva  June 6, 2018  1:15 PM

## 2018-06-07 ENCOUNTER — ALLIED HEALTH/NURSE VISIT (OUTPATIENT)
Dept: RADIATION ONCOLOGY | Facility: HOSPITAL | Age: 73
End: 2018-06-07

## 2018-06-07 DIAGNOSIS — C61 PROSTATE CANCER (H): Primary | ICD-10-CM

## 2018-06-07 PROCEDURE — 77385 ZZH IMRT TREATMENT DELIVERY, SIMPLE: CPT | Performed by: RADIOLOGY

## 2018-06-07 NOTE — PROGRESS NOTES
Steven Lopez received radiation therapy treatment today 06/07/18.    Kelly Pantoja  June 7, 2018  1:22 PM

## 2018-06-07 NOTE — PROGRESS NOTES
Service Date: 2018      RADIATION THERAPY PROGRESS NOTE      DIAGNOSIS:  Prostate carcinoma, Dorys 3+3.  However, with marked acceleration and PSA, historically, which is currently 9.95.      SUBJECTIVE:  The patient had had his PSA followed for several years prior to urologic evaluation.  It had remained very low, less than 1 from 7927-2622, taking approximately 5-6 years to rise to 2.77.  It then almost doubled in 2 years, and now, while it still has a doubling interval of approximately 4-5 years, appears to be going up exponentially or what is called an increasing velocity.  He has been on active surveillance with a score of Dorys 3+3.  However, is not a gentleman who is terribly skeptical of treatment or wants to avoid treatment.      RADIATION RX:  The patient has received 2090 cGy to date for treatment of the above-described prostate carcinoma.  He had a little bit of dysuria yesterday, but this has resolved today and he desires no further intervention.      OBJECTIVE:  Weight: 201 pounds.  Abdomen is benign, nontender.  Bowel sounds present and active.      IMPRESSION:  Routine tolerance to radiation therapy for prostate carcinoma.      PLAN:  Continue treatment as planned.            RANULFO TAPIA MD             D: 2018   T: 2018   MT: CORY      Name:     ROLDAN MARQUES   MRN:      [id number]        Account:      [id number]   :      1945           Service Date: 2018      Document: [id number]       cc: Sabina Flanagan DNP

## 2018-06-07 NOTE — MR AVS SNAPSHOT
After Visit Summary   6/7/2018    Steven Lopez    MRN: [id number]           Patient Information     Date Of Birth          1945        Visit Information        Provider Department      6/7/2018 1:15 PM [facility]        Today's Diagnoses     Prostate cancer (H)    -  1       Follow-ups after your visit        Your next 10 appointments already scheduled     Jun 08, 2018  1:15 PM CDT   Treatment with [facility]   [facility] ([facility] )    [address] [address] [phone]            Jun 11, 2018  1:15 PM CDT   Treatment with [facility]   [facility] ([facility] )    [address] [address] [phone]            Jun 12, 2018  1:15 PM CDT   Treatment with [facility]   [facility] ([facility] )    [address] [address] [phone]            Jun 13, 2018  1:15 PM CDT   Treatment with [facility]   [facility] ([facility] )    [address] [address] [phone]            Jun 13, 2018  1:30 PM CDT   on treatment visit with Noel Ballesteros MD   [facility] ([facility] )    [address] [address] [phone]            Jun 14, 2018  1:15 PM CDT   Treatment with [facility]   [facility] ([facility] )    [address] [address] [phone]            Dmitri 15, 2018  1:15 PM CDT   Treatment with [facility] CL[facility] IX   [facility] ([facility] )    [address] [address] [phone]            Jun 18, 2018  1:15 PM CDT   Treatment with [facility] CL[facility] IX   [facility] ([facility] )    [address] [address] [phone]            Jun 19, 2018  1:15 PM CDT   Treatment with HI CL[facility] IX   [facility] ([facility]  "[facility] )    [address] [address] [phone]            2018  1:15 PM CDT   Treatment with [facility] CL[facility] IX   [facility] ([facility] )    [address] [address] [phone]              Who to contact     If you have questions or need follow up information about today's clinic visit or your schedule please contact [facility] directly at [phone].  Normal or non-critical lab and imaging results will be communicated to you by MyChart, letter or phone within 4 business days after the clinic has received the results. If you do not hear from us within 7 days, please contact the clinic through "[employer]hart or phone. If you have a critical or abnormal lab result, we will notify you by phone as soon as possible.  Submit refill requests through [email or web address] or call your pharmacy and they will forward the refill request to us. Please allow 3 business days for your refill to be completed.          Additional Information About Your Visit        [email or web address] Information     [email or web address] lets you send messages to your doctor, view your test results, renew your prescriptions, schedule appointments and more. To sign up, go to [email or web address] . Click on \"Log in\" on the left side of the screen, which will take you to the Welcome page. Then click on \"Sign up Now\" on the right side of the page.     You will be asked to enter the access code listed below, as well as some personal information. Please follow the directions to create your username and password.     Your access code is: [email or web address]  Expires: 2018  2:55 PM     Your access code will  in 90 days. If you need help or a new code, please call your [place] clinic or [phone].        Care EveryWhere ID     This is your Care EveryWhere ID. This could be used by other organizations to access your [place] medical records  [id number]         Blood Pressure from Last 3 Encounters: "   06/06/18 130/84   05/30/18 152/86   05/23/18 170/78    Weight from Last 3 Encounters:   06/06/18 92.1 kg (203 lb)   05/30/18 91.6 kg (202 lb)   05/23/18 91.2 kg (201 lb)              Today, you had the following     No orders found for display       Primary Care Provider Office Phone # Fax #    Sabina Flanagan, [phone]       [place] [address] [address] [facility] [address]        Equal Access to Services     [facility]: Hadii aad ku hadasho Soomaali, waaxda luqadaha, qaybta kaalmada adeegyada, steven victoria . So [facility] [place] [phone].    ATENCIÓN: Si habla español, tiene a bergman disposición servicios gratuitos de asistencia lingüística. Ll[facility] [phone].    We comply with applicable federal civil rights laws and [place] laws. We do not discriminate on the basis of race, color, national origin, age, disability, sex, sexual orientation, or gender identity.            Thank you!     Thank you for choosing [facility]  for your care. Our goal is always to provide you with excellent care. Hearing back from our patients is one way we can continue to improve our services. Please take a few minutes to complete the written survey that you may receive in the mail after your visit with us. Thank you!             Your Updated Medication List - Protect others around you: Learn how to safely use, store and throw away your medicines at www.disposemymeds.org.          This list is accurate as of 6/7/18  1:35 PM.  Always use your most recent med list.                   Brand Name Dispense Instructions for use Diagnosis    aspirin 325 MG tablet      Take 325 mg by mouth daily        atorvastatin 20 MG tablet    LIPITOR     Take 20 mg by mouth daily        fish Oil 1200 MG capsule      Take 1,200 mg by mouth daily        GAS-X PO      Take 125 mg by mouth 3 times daily        metoprolol succinate 25 MG 24 hr tablet    TOPROL-XL     Take 25 mg by mouth daily

## 2018-06-08 ENCOUNTER — ALLIED HEALTH/NURSE VISIT (OUTPATIENT)
Dept: RADIATION ONCOLOGY | Facility: HOSPITAL | Age: 73
End: 2018-06-08

## 2018-06-08 DIAGNOSIS — C61 PROSTATE CANCER (H): Primary | ICD-10-CM

## 2018-06-08 PROCEDURE — 77385 ZZH IMRT TREATMENT DELIVERY, SIMPLE: CPT | Performed by: RADIOLOGY

## 2018-06-08 NOTE — PROGRESS NOTES
Steven Lopez received radiation therapy treatment today 06/08/18.    Ever Lr  June 8, 2018  1:24 PM

## 2018-06-08 NOTE — MR AVS SNAPSHOT
After Visit Summary   6/8/2018    Steven Lopez    MRN: [id number]           Patient Information     Date Of Birth          1945        Visit Information        Provider Department      6/8/2018 1:15 PM [facility]        Today's Diagnoses     Prostate cancer (H)    -  1       Follow-ups after your visit        Your next 10 appointments already scheduled     Jun 11, 2018  1:15 PM CDT   Treatment with [facility]   [facility] Radiation Oncology ([facility] )    [address] [address] [phone]            Jun 12, 2018  1:15 PM CDT   Treatment with [facility]   [facility] Radiation Oncology ([facility] )    [address] [address] [phone]            Jun 13, 2018  1:15 PM CDT   Treatment with [facility]   [facility] Radiation Oncology ([facility] )    [address] [address] [phone]            Jun 13, 2018  1:30 PM CDT   on treatment visit with Noel Ballesteros MD   [facility] Radiation Oncology ([facility] )    [address] [address] [phone]            Jun 14, 2018  1:15 PM CDT   Treatment with [facility]   [facility] Radiation Oncology ([facility] )    [address] [id number] [phone]            Dmitri 15, 2018  1:15 PM CDT   Treatment with [facility]   [facility] Radiation Oncology ([facility] )    [address] [id number] [phone]            Jun 18, 2018  1:15 PM CDT   Treatment with [facility]   [facility] Radiation Oncology ([facility] )    [address] [id number] [phone]            Jun 19, 2018  1:15 PM CDT   Treatment with [facility]   [facility] Radiation Oncology ([facility] )    [address] [id number] [phone]            Jun 20, 2018  1:15 PM CDT   Treatment with [facility]   HI Radiation Oncology ([facility] )    [address] [place] [address] [phone]            2018  1:30 PM CDT   on treatment visit with Noel Ballesteros MD   HI Radiation Oncology ([facility] )    [address] [place] [address] [phone]              Who to contact     If you have questions or need follow up information about today's clinic visit or your schedule please contact [facility] directly at [phone].  Normal or non-critical lab and imaging results will be communicated to you by MyChart, letter or phone within 4 business days after the clinic has received the results. If you do not hear from us within 7 days, please contact the clinic through [employer] or phone. If you have a critical or abnormal lab result, we will notify you by phone as soon as possible.  Submit refill requests through [employer] or call your pharmacy and they will forward the refill request to us. Please allow 3 business days for your refill to be completed.          Additional Information About Your Visit        [employer] Information     [employer] lets you send messages to your doctor, view your test results, renew your prescriptions, schedule appointments and more. To sign up, go to [email or web address] . Click on \"Log in\" on the left side of the screen, which will take you to the Welcome page. Then click on \"Sign up Now\" on the right side of the page.     You will be asked to enter the access code listed below, as well as some personal information. Please follow the directions to create your username and password.     Your access code is: [email or web address]  Expires: 2018  2:55 PM     Your access code will  in 90 days. If you need help or a new code, please call your [place] clinic or [phone].        Care EveryWhere ID     This is your Care EveryWhere ID. This could be used by other organizations to access your [place] medical records  [id number]         Blood Pressure from Last 3 " Encounters:   06/06/18 130/84   05/30/18 152/86   05/23/18 170/78    Weight from Last 3 Encounters:   06/06/18 92.1 kg (203 lb)   05/30/18 91.6 kg (202 lb)   05/23/18 91.2 kg (201 lb)              Today, you had the following     No orders found for display       Primary Care Provider Office Phone # Fax #    Sabina Flanagan, [phone]       [place] [address] [address] [facility] [address]        Equal Access to Services     [facility]: Hadii charlie ku hadasho Soomaali, waaxda luqadaha, qaybta kaalmada adeegyaabel, steven victoria . So [facility] [phone].    ATENCIÓN: Si habla español, tiene a bergman disposición servicios gratuitos de asistencia lingüística. [facility] [phone].    We comply with applicable federal civil rights laws and [place] laws. We do not discriminate on the basis of race, color, national origin, age, disability, sex, sexual orientation, or gender identity.            Thank you!     Thank you for choosing [facility]  for your care. Our goal is always to provide you with excellent care. Hearing back from our patients is one way we can continue to improve our services. Please take a few minutes to complete the written survey that you may receive in the mail after your visit with us. Thank you!             Your Updated Medication List - Protect others around you: Learn how to safely use, store and throw away your medicines at www.disposemymeds.org.          This list is accurate as of 6/8/18  1:24 PM.  Always use your most recent med list.                   Brand Name Dispense Instructions for use Diagnosis    aspirin 325 MG tablet      Take 325 mg by mouth daily        atorvastatin 20 MG tablet    LIPITOR     Take 20 mg by mouth daily        fish Oil 1200 MG capsule      Take 1,200 mg by mouth daily        GAS-X PO      Take 125 mg by mouth 3 times daily        metoprolol succinate 25 MG 24 hr tablet    TOPROL-XL     Take 25 mg by  mouth daily

## 2018-06-11 ENCOUNTER — ALLIED HEALTH/NURSE VISIT (OUTPATIENT)
Dept: RADIATION ONCOLOGY | Facility: HOSPITAL | Age: 73
End: 2018-06-11

## 2018-06-11 DIAGNOSIS — C61 PROSTATE CANCER (H): Primary | ICD-10-CM

## 2018-06-11 PROCEDURE — 77385 ZZH IMRT TREATMENT DELIVERY, SIMPLE: CPT | Performed by: RADIOLOGY

## 2018-06-11 NOTE — MR AVS SNAPSHOT
After Visit Summary   6/11/2018    Steven Lopez    MRN: [id number]           Patient Information     Date Of Birth          1945        Visit Information        Provider Department      6/11/2018 1:15 PM [facility] Rad[facility]        Today's Diagnoses     Prostate cancer (H)    -  1       Follow-ups after your visit        Your next 10 appointments already scheduled     Jun 12, 2018  1:15 PM CDT   Treatment with [facility]   [facility] Radiation Oncology ([facility] )    [address] [id number] [phone]            Jun 13, 2018  1:15 PM CDT   Treatment with [facility]   [facility] Radiation Oncology ([facility] )    [address] [address]   [phone]            Jun 13, 2018  1:30 PM CDT   on treatment visit with Noel Ballesteros MD   [facility] Radiation Oncology ([facility] )    [address] [id number] [phone]            Jun 14, 2018  1:15 PM CDT   Treatment with [facility]   [facility] Radiation Oncology ([facility] )    [address] [address] [phone]            Dmitri 15, 2018  1:15 PM CDT   Treatment with [facility]   [facility] Radiation Oncology ([facility] )    [address] [address]   [phone]            Jun 18, 2018  1:15 PM CDT   Treatment with [facility]   [facility] Radiation Oncology ([facility] )    [address] [address]   [phone]            Jun 19, 2018  1:15 PM CDT   Treatment with [facility]   [facility] Radiation Oncology ([facility] )    [address] [address]   [phone]            Jun 20, 2018  1:15 PM CDT   Treatment with [facility]   [facility] Radiation Oncology ([facility] )    [address] [address]   [phone]            Jun 20, 2018  1:30 PM CDT   on treatment visit with Noel Ballesteros MD   HI Radiation  "Oncology ([facility] )    [address] [address] [phone]            2018  1:15 PM CDT   Treatment with [facility]   [facility] Radiation Oncology ([facility] )    [address] [address] [phone]              Who to contact     If you have questions or need follow up information about today's clinic visit or your schedule please contact [facility] directly at [phone].  Normal or non-critical lab and imaging results will be communicated to you by MyChart, letter or phone within 4 business days after the clinic has received the results. If you do not hear from us within 7 days, please contact the clinic through [employer] or phone. If you have a critical or abnormal lab result, we will notify you by phone as soon as possible.  Submit refill requests through [employer] or call your pharmacy and they will forward the refill request to us. Please allow 3 business days for your refill to be completed.          Additional Information About Your Visit        [employer] Information     [employer] lets you send messages to your doctor, view your test results, renew your prescriptions, schedule appointments and more. To sign up, go to [email or web address] . Click on \"Log in\" on the left side of the screen, which will take you to the Welcome page. Then click on \"Sign up Now\" on the right side of the page.     You will be asked to enter the access code listed below, as well as some personal information. Please follow the directions to create your username and password.     Your access code is: [email or web address]  Expires: 2018  2:55 PM     Your access code will  in 90 days. If you need help or a new code, please call your [place] clinic or [phone].        Care EveryWhere ID     This is your Care EveryWhere ID. This could be used by other organizations to access your [place] medical records  [id number]         Blood Pressure from " Last 3 Encounters:   06/06/18 130/84   05/30/18 152/86   05/23/18 170/78    Weight from Last 3 Encounters:   06/06/18 92.1 kg (203 lb)   05/30/18 91.6 kg (202 lb)   05/23/18 91.2 kg (201 lb)              Today, you had the following     No orders found for display       Primary Care Provider Office Phone # Fax #    Sabina Flanagan, [phone]       [place] [address] [address] [facility] [address]        Equal Access to Services     [facility]: Hadii charlie ku hadasho Soomaali, waaxda luqadaha, qaybta kaalmada adeegyaabel, steven victoria . So [facility] [phone].    ATENCIÓN: Si habla español, tiene a bergman disposición servicios gratuitos de asistencia lingüística. [facility] [phone].    We comply with applicable federal civil rights laws and [place] laws. We do not discriminate on the basis of race, color, national origin, age, disability, sex, sexual orientation, or gender identity.            Thank you!     Thank you for choosing [facility]  for your care. Our goal is always to provide you with excellent care. Hearing back from our patients is one way we can continue to improve our services. Please take a few minutes to complete the written survey that you may receive in the mail after your visit with us. Thank you!             Your Updated Medication List - Protect others around you: Learn how to safely use, store and throw away your medicines at www.disposemymeds.org.          This list is accurate as of 6/11/18  1:31 PM.  Always use your most recent med list.                   Brand Name Dispense Instructions for use Diagnosis    aspirin 325 MG tablet      Take 325 mg by mouth daily        atorvastatin 20 MG tablet    LIPITOR     Take 20 mg by mouth daily        fish Oil 1200 MG capsule      Take 1,200 mg by mouth daily        GAS-X PO      Take 125 mg by mouth 3 times daily        metoprolol succinate 25 MG 24 hr tablet    TOPROL-XL     Take 25  mg by mouth daily

## 2018-06-11 NOTE — PROGRESS NOTES
Steven Lopez received radiation therapy treatment today 06/11/18.    Satnam Moise  June 11, 2018  1:26 PM

## 2018-06-12 ENCOUNTER — OFFICE VISIT (OUTPATIENT)
Dept: RADIATION ONCOLOGY | Facility: HOSPITAL | Age: 73
End: 2018-06-12

## 2018-06-12 ENCOUNTER — ALLIED HEALTH/NURSE VISIT (OUTPATIENT)
Dept: RADIATION ONCOLOGY | Facility: HOSPITAL | Age: 73
End: 2018-06-12

## 2018-06-12 DIAGNOSIS — C61 PROSTATE CANCER (H): Primary | ICD-10-CM

## 2018-06-12 PROCEDURE — 77385 ZZH IMRT TREATMENT DELIVERY, SIMPLE: CPT | Performed by: RADIOLOGY

## 2018-06-12 NOTE — MR AVS SNAPSHOT
After Visit Summary   6/12/2018    Steven Lopez    MRN: [id number]           Patient Information     Date Of Birth          1945        Visit Information        Provider Department      6/12/2018 1:15 PM [facility] Rad[facility]        Today's Diagnoses     Prostate cancer (H)    -  1       Follow-ups after your visit        Your next 10 appointments already scheduled     Jun 13, 2018  1:15 PM CDT   Treatment with [facility]   [facility] Radiation Oncology ([facility] )    [address] [phone] [phone]            Jun 13, 2018  1:30 PM CDT   on treatment visit with Noel Ballesteros MD   [facility] Radiation Oncology ([facility] )    [address] [address] [phone]            Jun 14, 2018  1:15 PM CDT   Treatment with [facility]   [facility] Radiation Oncology ([facility] )    [address] [address] [phone]            Dmitri 15, 2018  1:15 PM CDT   Treatment with [facility]   [facility] Radiation Oncology ([facility] )    [address] [id number] [phone]            Jun 18, 2018  1:15 PM CDT   Treatment with [facility]   [facility] Radiation Oncology ([facility] )    [address] [address] [phone]            Jun 19, 2018  1:15 PM CDT   Treatment with [facility]   [facility] Radiation Oncology ([facility] )    [address] [address] [phone]            Jun 20, 2018  1:15 PM CDT   Treatment with [facility]   [facility] Radiation Oncology ([facility] )    [address] [address] [phone]            Jun 20, 2018  1:30 PM CDT   on treatment visit with Noel Ballesteros MD   [facility] Radiation Oncology ([facility] )    [address] [address] [phone]            Jun 21, 2018  1:15 PM CDT   Treatment with [facility] CL[facility] IX   HI Radiation  "Oncology ([facility] )    [address] [address] [phone]            2018  1:15 PM CDT   Treatment with [facility] CL[facility] IX   HI Radiation Oncology ([facility] )    [address] [address] [phone]              Who to contact     If you have questions or need follow up information about today's clinic visit or your schedule please contact [facility] directly at [phone].  Normal or non-critical lab and imaging results will be communicated to you by MyChart, letter or phone within 4 business days after the clinic has received the results. If you do not hear from us within 7 days, please contact the clinic through [employer] or phone. If you have a critical or abnormal lab result, we will notify you by phone as soon as possible.  Submit refill requests through [employer] or call your pharmacy and they will forward the refill request to us. Please allow 3 business days for your refill to be completed.          Additional Information About Your Visit        [employer] Information     [employer] lets you send messages to your doctor, view your test results, renew your prescriptions, schedule appointments and more. To sign up, go to [email or web address] . Click on \"Log in\" on the left side of the screen, which will take you to the Welcome page. Then click on \"Sign up Now\" on the right side of the page.     You will be asked to enter the access code listed below, as well as some personal information. Please follow the directions to create your username and password.     Your access code is: [email or web address]  Expires: 2018  2:55 PM     Your access code will  in 90 days. If you need help or a new code, please call your [place] clinic or [phone].        Care EveryWhere ID     This is your Care EveryWhere ID. This could be used by other organizations to access your [place] medical records  [id number]         Blood Pressure from " Last 3 Encounters:   06/06/18 130/84   05/30/18 152/86   05/23/18 170/78    Weight from Last 3 Encounters:   06/06/18 92.1 kg (203 lb)   05/30/18 91.6 kg (202 lb)   05/23/18 91.2 kg (201 lb)              Today, you had the following     No orders found for display       Primary Care Provider Office Phone # Fax #    Sabina Flanagan, [phone]       [place] [address] [address] [facility] [address]        Equal Access to Services     [facility]: Hadii charlie ku hadasho Soomaali, waaxda luqadaha, qaybta kaalmada adeegyaabel, steven victoria . So [facility] [phone].    ATENCIÓN: Si habla español, tiene a bergman disposición servicios gratuitos de asistencia lingüística. [facility] [phone].    We comply with applicable federal civil rights laws and [place] laws. We do not discriminate on the basis of race, color, national origin, age, disability, sex, sexual orientation, or gender identity.            Thank you!     Thank you for choosing [facility]  for your care. Our goal is always to provide you with excellent care. Hearing back from our patients is one way we can continue to improve our services. Please take a few minutes to complete the written survey that you may receive in the mail after your visit with us. Thank you!             Your Updated Medication List - Protect others around you: Learn how to safely use, store and throw away your medicines at www.disposemymeds.org.          This list is accurate as of 6/12/18  1:22 PM.  Always use your most recent med list.                   Brand Name Dispense Instructions for use Diagnosis    aspirin 325 MG tablet      Take 325 mg by mouth daily        atorvastatin 20 MG tablet    LIPITOR     Take 20 mg by mouth daily        fish Oil 1200 MG capsule      Take 1,200 mg by mouth daily        GAS-X PO      Take 125 mg by mouth 3 times daily        metoprolol succinate 25 MG 24 hr tablet    TOPROL-XL     Take 25  mg by mouth daily

## 2018-06-12 NOTE — PROGRESS NOTES
Progress Notes  Encounter Date: Jun 12, 2018  Joanna Alcocer MD     RADIATION THERAPY PROGRESS NOTE         REFERRING PHYSICIANS: Sabina Flanagan DNP, Daniel Matthews DO      DIAGNOSIS: Prostate carcinoma [place] 6 with rising PSA level, currently 9.95.      RADIATION THERAPY:    Steven Lopez has received 4560 cGy to date to the prostate and seminal vesicles.       SUBJECTIVE:    He is doing well currently. He has minimal complaints.  He notices increased fatigue.  His bladder habits have not changed.  He recently had mild constipation but denies diarrhea or loose stools.  I recommend he take a Senokot before bed but he prefers to observe the situation.      OBJECTIVE:    WEIGHT: 201.8 Examination reveals well-developed well-nourished male in no apparent distress.  He is awake alert and oriented.        IMPRESSION:  Routine tolerance to radiation therapy.       PLAN:  Continue treatment as planned .           Joanna Alcocer MD

## 2018-06-12 NOTE — MR AVS SNAPSHOT
After Visit Summary   6/12/2018    Steven Lopez    MRN: [id number]           Patient Information     Date Of Birth          1945        Visit Information        Provider Department      6/12/2018 2:45 PM Noel Ballesteros MD [facility]        Today's Diagnoses     Prostate cancer (H)    -  1       Follow-ups after your visit        Your next 10 appointments already scheduled     Dmitri 15, 2018  1:15 PM CDT   Treatment with [facility]   [facility] ([facility] )    [address] [id number] [phone]            Jun 18, 2018  1:15 PM CDT   Treatment with [facility]   [facility] ([facility] )    [address] [address] [phone]            Jun 19, 2018  1:15 PM CDT   Treatment with [facility]   [facility] ([facility] )    [address] [id number] [phone]            Jun 20, 2018  1:15 PM CDT   Treatment with [facility]   [facility] ([facility] )    [address] [id number] [phone]            Jun 20, 2018  1:30 PM CDT   on treatment visit with Noel Ballesteros MD   [facility] ([facility] )    [address] [address] [phone]            Jun 21, 2018  1:15 PM CDT   Treatment with [facility]   [facility] ([facility] )    [address] [id number] [phone]            Jun 22, 2018  1:15 PM CDT   Treatment with [facility]   [facility] ([facility] )    [address] [address]   [phone]            Jun 25, 2018  1:15 PM CDT   Treatment with [facility]   [facility] ([facility] )    [address] [id number] [phone]            Jun 26, 2018  1:15 PM CDT   Treatment with [facility]   [facility]  "([facility] )    [address] [address] [phone]            2018  1:15 PM CDT   Treatment with [facility]   [facility] ([facility] )    [address] [address] [phone]              Who to contact     If you have questions or need follow up information about today's clinic visit or your schedule please contact [facility] directly at [phone].  Normal or non-critical lab and imaging results will be communicated to you by MyChart, letter or phone within 4 business days after the clinic has received the results. If you do not hear from us within 7 days, please contact the clinic through [employer] or phone. If you have a critical or abnormal lab result, we will notify you by phone as soon as possible.  Submit refill requests through "[employer] or call your pharmacy and they will forward the refill request to us. Please allow 3 business days for your refill to be completed.          Additional Information About Your Visit        "[employer] Information     "[employer] lets you send messages to your doctor, view your test results, renew your prescriptions, schedule appointments and more. To sign up, go to [email or web address] . Click on \"Log in\" on the left side of the screen, which will take you to the Welcome page. Then click on \"Sign up Now\" on the right side of the page.     You will be asked to enter the access code listed below, as well as some personal information. Please follow the directions to create your username and password.     Your access code is: [email or web address]  Expires: 2018  2:55 PM     Your access code will  in 90 days. If you need help or a new code, please call your [place] clinic or [phone].        Care EveryWhere ID     This is your Care EveryWhere ID. This could be used by other organizations to access your [place] medical records  [id number]         Blood Pressure from Last 3 " Encounters:   06/13/18 138/82   06/06/18 130/84   05/30/18 152/86    Weight from Last 3 Encounters:   06/13/18 91.2 kg (201 lb)   06/06/18 92.1 kg (203 lb)   05/30/18 91.6 kg (202 lb)              Today, you had the following     No orders found for display       Primary Care Provider Office Phone # Fax #    Sabina Flanagan, [phone]       [place] [address] [address] [facility] [address]        Equal Access to Services     [facility]: Hadii charlie ku hadasho Soomaali, waaxda luqadaha, qaybta kaalmada adeegyabael, steven victoria . So [facility] [phone].    ATENCIÓN: Si habla español, tiene a bergman disposición servicios gratuitos de asistencia lingüística. [facility] [phone].    We comply with applicable federal civil rights laws and [place] laws. We do not discriminate on the basis of race, color, national origin, age, disability, sex, sexual orientation, or gender identity.            Thank you!     Thank you for choosing [facility]  for your care. Our goal is always to provide you with excellent care. Hearing back from our patients is one way we can continue to improve our services. Please take a few minutes to complete the written survey that you may receive in the mail after your visit with us. Thank you!             Your Updated Medication List - Protect others around you: Learn how to safely use, store and throw away your medicines at www.disposemymeds.org.          This list is accurate as of 6/12/18 11:59 PM.  Always use your most recent med list.                   Brand Name Dispense Instructions for use Diagnosis    aspirin 325 MG tablet      Take 325 mg by mouth daily        atorvastatin 20 MG tablet    LIPITOR     Take 20 mg by mouth daily        fish Oil 1200 MG capsule      Take 1,200 mg by mouth daily        GAS-X PO      Take 125 mg by mouth 3 times daily        metoprolol succinate 25 MG 24 hr tablet    TOPROL-XL     Take 25 mg by  mouth daily

## 2018-06-12 NOTE — PROGRESS NOTES
Steven Lopez received radiation therapy treatment today 06/12/18.    Satnam Moise  June 12, 2018  1:22 PM

## 2018-06-13 ENCOUNTER — ALLIED HEALTH/NURSE VISIT (OUTPATIENT)
Dept: RADIATION ONCOLOGY | Facility: HOSPITAL | Age: 73
End: 2018-06-13

## 2018-06-13 ENCOUNTER — OFFICE VISIT (OUTPATIENT)
Dept: RADIATION ONCOLOGY | Facility: HOSPITAL | Age: 73
End: 2018-06-13

## 2018-06-13 DIAGNOSIS — C61 PROSTATE CANCER (H): Primary | ICD-10-CM

## 2018-06-13 PROCEDURE — 77336 RADIATION PHYSICS CONSULT: CPT | Performed by: RADIOLOGY

## 2018-06-13 PROCEDURE — 77385 ZZH IMRT TREATMENT DELIVERY, SIMPLE: CPT | Performed by: RADIOLOGY

## 2018-06-13 ASSESSMENT — PAIN SCALES - GENERAL: PAINLEVEL: NO PAIN (0)

## 2018-06-13 NOTE — MR AVS SNAPSHOT
After Visit Summary   6/13/2018    Steven Lopez    MRN: [id number]           Patient Information     Date Of Birth          1945        Visit Information        Provider Department      6/13/2018 1:30 PM Noel Ballesteros MD [facility]        Today's Diagnoses     Prostate cancer (H)    -  1       Follow-ups after your visit        Your next 10 appointments already scheduled     Dmitri 15, 2018  1:15 PM CDT   Treatment with [facility]   [facility] ([facility] )    [address] [id number] [phone]            Jun 18, 2018  1:15 PM CDT   Treatment with [facility]   [facility] ([facility] )    [address] [address] [phone]            Jun 19, 2018  1:15 PM CDT   Treatment with [facility]   [facility] ([facility] )    [address] [address] [phone]            Jun 20, 2018  1:15 PM CDT   Treatment with [facility]   [facility] ([facility] )    [address] [address] [phone]            Jun 20, 2018  1:30 PM CDT   on treatment visit with Noel Ballesteros MD   [facility] ([facility] )    [address] [id number] [phone]            Jun 21, 2018  1:15 PM CDT   Treatment with [facility]   [facility] ([facility] )    [address] [address] [phone]            Jun 22, 2018  1:15 PM CDT   Treatment with [facility]   [facility] ([facility] )    [address] [address]   [phone]            Jun 25, 2018  1:15 PM CDT   Treatment with [facility]   [facility] ([facility] )    [address] [address] [phone]            Jun 26, 2018  1:15 PM CDT   Treatment with [facility]   [facility]  "([facility] )    [address] [address] [phone]            2018  1:15 PM CDT   Treatment with [facility]   [facility] ([facility] )    [address] [address] [phone]              Who to contact     If you have questions or need follow up information about today's clinic visit or your schedule please contact [facility] directly at [phone].  Normal or non-critical lab and imaging results will be communicated to you by MyChart, letter or phone within 4 business days after the clinic has received the results. If you do not hear from us within 7 days, please contact the clinic through [employer] or phone. If you have a critical or abnormal lab result, we will notify you by phone as soon as possible.  Submit refill requests through [employer] or call your pharmacy and they will forward the refill request to us. Please allow 3 business days for your refill to be completed.          Additional Information About Your Visit        [employer] Information     [employer] lets you send messages to your doctor, view your test results, renew your prescriptions, schedule appointments and more. To sign up, go to [email or web address] . Click on \"Log in\" on the left side of the screen, which will take you to the Welcome page. Then click on \"Sign up Now\" on the right side of the page.     You will be asked to enter the access code listed below, as well as some personal information. Please follow the directions to create your username and password.     Your access code is: [email or web address]  Expires: 2018  2:55 PM     Your access code will  in 90 days. If you need help or a new code, please call your [place] clinic or [phone].        Care EveryWhere ID     This is your Care EveryWhere ID. This could be used by other organizations to access your [place] medical records  [id number]        Your Vitals Were     Pulse " Respirations BMI (Body Mass Index)             52 16 30.56 kg/m2          Blood Pressure from Last 3 Encounters:   06/13/18 138/82   06/06/18 130/84   05/30/18 152/86    Weight from Last 3 Encounters:   06/13/18 91.2 kg (201 lb)   06/06/18 92.1 kg (203 lb)   05/30/18 91.6 kg (202 lb)              Today, you had the following     No orders found for display       Primary Care Provider Office Phone # Fax #    Sabina Panchito, [phone]       [place] [address] [facility] [address]        Equal Access to Services     [facility]: Hadii charlie Manzano, wasarah stark, qaybta kaalmada michelle, steven victoria . So [facility] [place] [phone].    ATENCIÓN: Si habla español, tiene a bergman disposición servicios gratuitos de asistencia lingüística. [facility] [phone].    We comply with applicable federal civil rights laws and [place] laws. We do not discriminate on the basis of race, color, national origin, age, disability, sex, sexual orientation, or gender identity.            Thank you!     Thank you for choosing [facility]  for your care. Our goal is always to provide you with excellent care. Hearing back from our patients is one way we can continue to improve our services. Please take a few minutes to complete the written survey that you may receive in the mail after your visit with us. Thank you!             Your Updated Medication List - Protect others around you: Learn how to safely use, store and throw away your medicines at www.disposemymeds.org.          This list is accurate as of 6/13/18 11:59 PM.  Always use your most recent med list.                   Brand Name Dispense Instructions for use Diagnosis    aspirin 325 MG tablet      Take 325 mg by mouth daily        atorvastatin 20 MG tablet    LIPITOR     Take 20 mg by mouth daily        fish Oil 1200 MG capsule      Take 1,200 mg by mouth daily        GAS-X PO      Take 125 mg  by mouth 3 times daily        metoprolol succinate 25 MG 24 hr tablet    TOPROL-XL     Take 25 mg by mouth daily

## 2018-06-13 NOTE — PROGRESS NOTES
Steven Lopez received radiation therapy treatment today 06/13/18.    Satnam Moise  June 13, 2018  1:11 PM

## 2018-06-13 NOTE — MR AVS SNAPSHOT
After Visit Summary   6/13/2018    Steven Lopez    MRN: [id number]           Patient Information     Date Of Birth          1945        Visit Information        Provider Department      6/13/2018 1:15 PM [facility]        Today's Diagnoses     Prostate cancer (H)    -  1       Follow-ups after your visit        Your next 10 appointments already scheduled     Jun 13, 2018  1:30 PM CDT   on treatment visit with Noel Ballesteros MD   [facility] ([facility] )    [address] [phone] [phone]            Jun 14, 2018  1:15 PM CDT   Treatment with [facility]   [facility] ([facility] )    [address] [address] [phone]            Dmitri 15, 2018  1:15 PM CDT   Treatment with [facility]   [facility] ([facility] )    [address] [address] [phone]            Jun 18, 2018  1:15 PM CDT   Treatment with [facility]   [facility] ([facility] )    [address] [address] [phone]            Jun 19, 2018  1:15 PM CDT   Treatment with [facility]   [facility] ([facility] )    [address] [address]   [phone]            Jun 20, 2018  1:15 PM CDT   Treatment with [facility]   [facility] ([facility] )    [address] [address] [phone]            Jun 20, 2018  1:30 PM CDT   on treatment visit with Noel Ballesteros MD   [facility] ([facility] )    [address] [address] [phone]            Jun 21, 2018  1:15 PM CDT   Treatment with [facility]   [facility] ([facility] )    [address] [address] [phone]            Jun 22, 2018  1:15 PM CDT   Treatment with [facility] CL[facility] IX   [facility] Radiation  "Oncology ([facility] )    [address] [address] [phone]            2018  1:15 PM CDT   Treatment with [facility] CL[facility] IX   [facility] ([facility] )    [address] [address] [phone]              Who to contact     If you have questions or need follow up information about today's clinic visit or your schedule please contact [facility] directly at [phone].  Normal or non-critical lab and imaging results will be communicated to you by MyChart, letter or phone within 4 business days after the clinic has received the results. If you do not hear from us within 7 days, please contact the clinic through [employer] or phone. If you have a critical or abnormal lab result, we will notify you by phone as soon as possible.  Submit refill requests through [employer] or call your pharmacy and they will forward the refill request to us. Please allow 3 business days for your refill to be completed.          Additional Information About Your Visit        [employer] Information     [employer] lets you send messages to your doctor, view your test results, renew your prescriptions, schedule appointments and more. To sign up, go to [email or web address] . Click on \"Log in\" on the left side of the screen, which will take you to the Welcome page. Then click on \"Sign up Now\" on the right side of the page.     You will be asked to enter the access code listed below, as well as some personal information. Please follow the directions to create your username and password.     Your access code is: [email or web address]  Expires: 2018  2:55 PM     Your access code will  in 90 days. If you need help or a new code, please call your [place] clinic or [phone].        Care EveryWhere ID     This is your Care EveryWhere ID. This could be used by other organizations to access your [place] medical records  [id number]         Blood Pressure from " Last 3 Encounters:   06/06/18 130/84   05/30/18 152/86   05/23/18 170/78    Weight from Last 3 Encounters:   06/06/18 92.1 kg (203 lb)   05/30/18 91.6 kg (202 lb)   05/23/18 91.2 kg (201 lb)              Today, you had the following     No orders found for display       Primary Care Provider Office Phone # Fax #    Sabina Flanagan, [phone]       [place] [address] [address] [facility] [address]        Equal Access to Services     [facility]: Hadii charlie ku hadasho Soomaali, waaxda luqadaha, qaybta kaalmada adeegyaabel, steven victoria . So [facility] [phone].    ATENCIÓN: Si habla español, tiene a bergman disposición servicios gratuitos de asistencia lingüística. [facility] [phone].    We comply with applicable federal civil rights laws and [place] laws. We do not discriminate on the basis of race, color, national origin, age, disability, sex, sexual orientation, or gender identity.            Thank you!     Thank you for choosing [facility]  for your care. Our goal is always to provide you with excellent care. Hearing back from our patients is one way we can continue to improve our services. Please take a few minutes to complete the written survey that you may receive in the mail after your visit with us. Thank you!             Your Updated Medication List - Protect others around you: Learn how to safely use, store and throw away your medicines at www.disposemymeds.org.          This list is accurate as of 6/13/18  1:22 PM.  Always use your most recent med list.                   Brand Name Dispense Instructions for use Diagnosis    aspirin 325 MG tablet      Take 325 mg by mouth daily        atorvastatin 20 MG tablet    LIPITOR     Take 20 mg by mouth daily        fish Oil 1200 MG capsule      Take 1,200 mg by mouth daily        GAS-X PO      Take 125 mg by mouth 3 times daily        metoprolol succinate 25 MG 24 hr tablet    TOPROL-XL     Take 25  mg by mouth daily

## 2018-06-14 ENCOUNTER — ALLIED HEALTH/NURSE VISIT (OUTPATIENT)
Dept: RADIATION ONCOLOGY | Facility: HOSPITAL | Age: 73
End: 2018-06-14

## 2018-06-14 VITALS
HEART RATE: 52 BPM | BODY MASS INDEX: 30.56 KG/M2 | RESPIRATION RATE: 16 BRPM | DIASTOLIC BLOOD PRESSURE: 82 MMHG | SYSTOLIC BLOOD PRESSURE: 138 MMHG | WEIGHT: 201 LBS

## 2018-06-14 DIAGNOSIS — C61 PROSTATE CANCER (H): Primary | ICD-10-CM

## 2018-06-14 PROCEDURE — 77385 ZZH IMRT TREATMENT DELIVERY, SIMPLE: CPT | Performed by: RADIOLOGY

## 2018-06-14 NOTE — MR AVS SNAPSHOT
After Visit Summary   6/14/2018    Steven Lopez    MRN: [id number]           Patient Information     Date Of Birth          1945        Visit Information        Provider Department      6/14/2018 1:15 PM [facility]        Today's Diagnoses     Prostate cancer (H)    -  1       Follow-ups after your visit        Your next 10 appointments already scheduled     Dmitri 15, 2018  1:15 PM CDT   Treatment with [facility]   [facility] Radiation Oncology ([facility] )    [address] [address] [phone]            Jun 18, 2018  1:15 PM CDT   Treatment with [facility]   [facility] Radiation Oncology ([facility] )    [address] [address] [phone]            Jun 19, 2018  1:15 PM CDT   Treatment with [facility]   [facility] Radiation Oncology ([facility] )    [address] [address] [phone]            Jun 20, 2018  1:15 PM CDT   Treatment with [facility]   [facility] Rad[facility] Oncology ([facility] )    [address] [id number] [phone]            Jun 20, 2018  1:30 PM CDT   on treatment visit with Noel Ballesteros MD   [facility] Radiation Oncology ([facility] )    [address] [address] [phone]            Jun 21, 2018  1:15 PM CDT   Treatment with [facility]   [facility] Radiation Oncology ([facility] )    [address] [id number] [phone]            Jun 22, 2018  1:15 PM CDT   Treatment with [facility]   [facility] Radiation Oncology ([facility] )    [address] [id number] [phone]            Jun 25, 2018  1:15 PM CDT   Treatment with [facility]   [facility] Radiation Oncology ([facility] )    [address] [id number] [phone]            Jun 26, 2018  1:15 PM CDT   Treatment with [facility]   [facility] Radiation Oncology ([facility]    [address] [address] [phone]            2018  1:15 PM CDT   Treatment with [facility] CL[facility] IX   HI Radiation Oncology ([facility] )    [address] [address] [phone]              Who to contact     If you have questions or need follow up information about today's clinic visit or your schedule please contact [facility] directly at [phone].  Normal or non-critical lab and imaging results will be communicated to you by Make It Workhart, letter or phone within 4 business days after the clinic has received the results. If you do not hear from us within 7 days, please contact the clinic through Make It Workhart or phone. If you have a critical or abnormal lab result, we will notify you by phone as soon as possible.  Submit refill requests through AppliLog or call your pharmacy and they will forward the refill request to us. Please allow 3 business days for your refill to be completed.          Additional Information About Your Visit        AppliLog Information     AppliLog lets you send messages to your doctor, view your test results, renew your prescriptions, schedule appointments and more. To sign up, go to [email or web address] . Click on \"Log in\" on the left side of the screen, which will take you to the Welcome page. Then click on \"Sign up Now\" on the right side of the page.     You will be asked to enter the access code listed below, as well as some personal information. Please follow the directions to create your username and password.     Your access code is: [email or web address]  Expires: 2018  2:55 PM     Your access code will  in 90 days. If you need help or a new code, please call your [place] clinic or [phone].        Care EveryWhere ID     This is your Care EveryWhere ID. This could be used by other organizations to access your [place] medical records  [id number]         Blood Pressure from Last 3 " Encounters:   06/13/18 138/82   06/06/18 130/84   05/30/18 152/86    Weight from Last 3 Encounters:   06/13/18 91.2 kg (201 lb)   06/06/18 92.1 kg (203 lb)   05/30/18 91.6 kg (202 lb)              Today, you had the following     No orders found for display       Primary Care Provider Office Phone # Fax #    Sabina Flanagan, [phone]       [place] [address] [address] [facility] [address]        Equal Access to Services     [facility]: Hadii charlie ku hadasho Soomaali, waaxda luqadaha, qaybta kaalmada adeegyaabel, steven victoria . So [facility] [phone].    ATENCIÓN: Si habla español, tiene a bergman disposición servicios gratuitos de asistencia lingüística. [facility] [phone].    We comply with applicable federal civil rights laws and [place] laws. We do not discriminate on the basis of race, color, national origin, age, disability, sex, sexual orientation, or gender identity.            Thank you!     Thank you for choosing [facility]  for your care. Our goal is always to provide you with excellent care. Hearing back from our patients is one way we can continue to improve our services. Please take a few minutes to complete the written survey that you may receive in the mail after your visit with us. Thank you!             Your Updated Medication List - Protect others around you: Learn how to safely use, store and throw away your medicines at www.disposemymeds.org.          This list is accurate as of 6/14/18  1:50 PM.  Always use your most recent med list.                   Brand Name Dispense Instructions for use Diagnosis    aspirin 325 MG tablet      Take 325 mg by mouth daily        atorvastatin 20 MG tablet    LIPITOR     Take 20 mg by mouth daily        fish Oil 1200 MG capsule      Take 1,200 mg by mouth daily        GAS-X PO      Take 125 mg by mouth 3 times daily        metoprolol succinate 25 MG 24 hr tablet    TOPROL-XL     Take 25 mg by  mouth daily

## 2018-06-14 NOTE — PROGRESS NOTES
Steven Lopez received radiation therapy treatment today 06/14/18.    Satnam Moise  June 14, 2018  1:44 PM

## 2018-06-15 ENCOUNTER — ALLIED HEALTH/NURSE VISIT (OUTPATIENT)
Dept: RADIATION ONCOLOGY | Facility: HOSPITAL | Age: 73
End: 2018-06-15

## 2018-06-15 DIAGNOSIS — C61 PROSTATE CANCER (H): Primary | ICD-10-CM

## 2018-06-15 PROCEDURE — 77385 ZZH IMRT TREATMENT DELIVERY, SIMPLE: CPT | Performed by: RADIOLOGY

## 2018-06-15 NOTE — PROGRESS NOTES
Steven Lopez received radiation therapy treatment today 06/15/18.    Ever Lr  Lorin 15, 2018  1:20 PM

## 2018-06-15 NOTE — MR AVS SNAPSHOT
After Visit Summary   6/15/2018    Steven Lopez    MRN: [id number]           Patient Information     Date Of Birth          1945        Visit Information        Provider Department      6/15/2018 1:15 PM [facility] Rad[facility]        Today's Diagnoses     Prostate cancer (H)    -  1       Follow-ups after your visit        Your next 10 appointments already scheduled     Jun 18, 2018  1:15 PM CDT   Treatment with [facility]   HI Radiation Oncology ([facility] )    [address] [address] [phone]            Jun 19, 2018  1:15 PM CDT   Treatment with [facility]   HI Radiation Oncology ([facility] )    [address] [address] [phone]            Jun 20, 2018  1:15 PM CDT   Treatment with [facility]   [facility] Radiation Oncology ([facility] )    [address] [address] [phone]            Jun 20, 2018  1:30 PM CDT   on treatment visit with Noel Ballesteros MD   [facility] Radiation Oncology ([facility] )    [address] [address] [phone]            Jun 21, 2018  1:15 PM CDT   Treatment with [facility]   [facility] Radiation Oncology ([facility] )    [address] [address]   [phone]            Jun 22, 2018  1:15 PM CDT   Treatment with [facility] CL[facility]   [facility] Radiation Oncology ([facility] )    [address] [id number] [phone]            Jun 25, 2018  1:15 PM CDT   Treatment with [facility] CL[facility]   [facility] Radiation Oncology ([facility] )    [address] [address] [phone]            Jun 26, 2018  1:15 PM CDT   Treatment with [facility] CL[facility]   HI Radiation Oncology ([facility] )    [address] [address] [phone]            Jun 27, 2018  1:15 PM CDT   Treatment with [facility] CLIN[facility] IX   HI Radiation Oncology ([facility] )    [address] [address] [phone]            2018  1:30 PM CDT   on treatment visit with Noel Ballesteros MD   HI Radiation Oncology ([facility] )    [address] [address] [phone]              Who to contact     If you have questions or need follow up information about today's clinic visit or your schedule please contact [facility] directly at [phone].  Normal or non-critical lab and imaging results will be communicated to you by MyChart, letter or phone within 4 business days after the clinic has received the results. If you do not hear from us within 7 days, please contact the clinic through [email or web address] or phone. If you have a critical or abnormal lab result, we will notify you by phone as soon as possible.  Submit refill requests through Sanlorenzo or call your pharmacy and they will forward the refill request to us. Please allow 3 business days for your refill to be completed.          Additional Information About Your Visit        Sanlorenzo Information     Sanlorenzo lets you send messages to your doctor, view your test results, renew your prescriptions, schedule appointments and more. To sign up, go to [email or web address] . Click on \"Log in\" on the left side of the screen, which will take you to the Welcome page. Then click on \"Sign up Now\" on the right side of the page.     You will be asked to enter the access code listed below, as well as some personal information. Please follow the directions to create your username and password.     Your access code is: [email or web address]  Expires: 2018  2:55 PM     Your access code will  in 90 days. If you need help or a new code, please call your [place] clinic or [phone].        Care EveryWhere ID     This is your Care EveryWhere ID. This could be used by other organizations to access your [place] medical records  [id number]         Blood Pressure from " Last 3 Encounters:   06/13/18 138/82   06/06/18 130/84   05/30/18 152/86    Weight from Last 3 Encounters:   06/13/18 91.2 kg (201 lb)   06/06/18 92.1 kg (203 lb)   05/30/18 91.6 kg (202 lb)              Today, you had the following     No orders found for display       Primary Care Provider Office Phone # Fax #    Sabina Flanagan, [phone]       [place] [address] [address] [facility] [address]        Equal Access to Services     [facility]: Hadii charlie ku hadasho Soomaali, waaxda luqadaha, qaybta kaalmada adeegyaabel, steven victoria . So [facility] [phone].    ATENCIÓN: Si habla español, tiene a bergman disposición servicios gratuitos de asistencia lingüística. [facility] [phone].    We comply with applicable federal civil rights laws and [place] laws. We do not discriminate on the basis of race, color, national origin, age, disability, sex, sexual orientation, or gender identity.            Thank you!     Thank you for choosing [facility]  for your care. Our goal is always to provide you with excellent care. Hearing back from our patients is one way we can continue to improve our services. Please take a few minutes to complete the written survey that you may receive in the mail after your visit with us. Thank you!             Your Updated Medication List - Protect others around you: Learn how to safely use, store and throw away your medicines at www.disposemymeds.org.          This list is accurate as of 6/15/18  1:27 PM.  Always use your most recent med list.                   Brand Name Dispense Instructions for use Diagnosis    aspirin 325 MG tablet      Take 325 mg by mouth daily        atorvastatin 20 MG tablet    LIPITOR     Take 20 mg by mouth daily        fish Oil 1200 MG capsule      Take 1,200 mg by mouth daily        GAS-X PO      Take 125 mg by mouth 3 times daily        metoprolol succinate 25 MG 24 hr tablet    TOPROL-XL     Take 25  mg by mouth daily

## 2018-06-18 ENCOUNTER — ALLIED HEALTH/NURSE VISIT (OUTPATIENT)
Dept: RADIATION ONCOLOGY | Facility: HOSPITAL | Age: 73
End: 2018-06-18

## 2018-06-18 DIAGNOSIS — C61 PROSTATE CANCER (H): Primary | ICD-10-CM

## 2018-06-18 PROCEDURE — 77385 ZZH IMRT TREATMENT DELIVERY, SIMPLE: CPT | Performed by: RADIOLOGY

## 2018-06-18 NOTE — PROGRESS NOTES
Steven Lopez received radiation therapy treatment today 06/18/18.    Ever Lr  June 18, 2018  1:15 PM

## 2018-06-18 NOTE — MR AVS SNAPSHOT
After Visit Summary   6/18/2018    Steven Lopez    MRN: [id number]           Patient Information     Date Of Birth          1945        Visit Information        Provider Department      6/18/2018 1:15 PM [facility] Radiation Oncology        Today's Diagnoses     Prostate cancer (H)    -  1       Follow-ups after your visit        Your next 10 appointments already scheduled     Jun 19, 2018  1:15 PM CDT   Treatment with [facility] IX   HI Radiation Oncology ([facility] )    [address] [address]   [phone]            Jun 20, 2018  1:15 PM CDT   Treatment with [facility] CL[facility] IX   [facility] Radiation Oncology ([facility] )    [address] [address]   [phone]            Jun 20, 2018  1:30 PM CDT   on treatment visit with Noel Ballesteros MD   HI Radiation Oncology ([facility] )    [address] [address]   [phone]            Jun 21, 2018  1:15 PM CDT   Treatment with [facility] CL[facility] IX   HI Radiation Oncology ([facility] )    [address] [address]   [phone]            Jun 22, 2018  1:15 PM CDT   Treatment with [facility] CL[facility] IX   HI Radiation Oncology ([facility] )    [address] [address]   [phone]            Jun 25, 2018  1:15 PM CDT   Treatment with HI CL[facility] IX   HI Radiation Oncology ([facility] )    [address] [address]   [phone]            Jun 26, 2018  1:15 PM CDT   Treatment with HI CLIN[facility] IX   HI Radiation Oncology ([facility] )    [address] [address]   [phone]            Jun 27, 2018  1:15 PM CDT   Treatment with HI CL[facility] IX   [facility] Radiation Oncology ([facility] )    [address] [address]   [phone]            Jun 27, 2018  1:30 PM CDT   on treatment visit with Noel Ballesteros MD   HI Radiation  "Oncology ([facility] )    [address] [address] [phone]            2018  1:15 PM CDT   Treatment with [facility] CLIN[facility] IX   HI Radiation Oncology ([facility] )    [address] [address] [phone]              Who to contact     If you have questions or need follow up information about today's clinic visit or your schedule please contact [facility] directly at [phone].  Normal or non-critical lab and imaging results will be communicated to you by MyChart, letter or phone within 4 business days after the clinic has received the results. If you do not hear from us within 7 days, please contact the clinic through Linkdexhart or phone. If you have a critical or abnormal lab result, we will notify you by phone as soon as possible.  Submit refill requests through Pigeonly or call your pharmacy and they will forward the refill request to us. Please allow 3 business days for your refill to be completed.          Additional Information About Your Visit        Pigeonly Information     Pigeonly lets you send messages to your doctor, view your test results, renew your prescriptions, schedule appointments and more. To sign up, go to [email or web address] . Click on \"Log in\" on the left side of the screen, which will take you to the Welcome page. Then click on \"Sign up Now\" on the right side of the page.     You will be asked to enter the access code listed below, as well as some personal information. Please follow the directions to create your username and password.     Your access code is: [email or web address]  Expires: 2018  2:55 PM     Your access code will  in 90 days. If you need help or a new code, please call your [place] clinic or [phone].        Care EveryWhere ID     This is your Care EveryWhere ID. This could be used by other organizations to access your [place] medical records  [id number]         Blood Pressure from " Last 3 Encounters:   06/13/18 138/82   06/06/18 130/84   05/30/18 152/86    Weight from Last 3 Encounters:   06/13/18 91.2 kg (201 lb)   06/06/18 92.1 kg (203 lb)   05/30/18 91.6 kg (202 lb)              Today, you had the following     No orders found for display       Primary Care Provider Office Phone # Fax #    Sabina Flanagan, [phone]       [place] [address] [address] [facility] [address]        Equal Access to Services     [facility]: Hadii charlie ku hadasho Soomaali, waaxda luqadaha, qaybta kaalmada adeegyaabel, steven victoria . So [facility] [phone].    ATENCIÓN: Si habla español, tiene a bergman disposición servicios gratuitos de asistencia lingüística. [facility] [phone].    We comply with applicable federal civil rights laws and [place] laws. We do not discriminate on the basis of race, color, national origin, age, disability, sex, sexual orientation, or gender identity.            Thank you!     Thank you for choosing [facility]  for your care. Our goal is always to provide you with excellent care. Hearing back from our patients is one way we can continue to improve our services. Please take a few minutes to complete the written survey that you may receive in the mail after your visit with us. Thank you!             Your Updated Medication List - Protect others around you: Learn how to safely use, store and throw away your medicines at www.disposemymeds.org.          This list is accurate as of 6/18/18  1:25 PM.  Always use your most recent med list.                   Brand Name Dispense Instructions for use Diagnosis    aspirin 325 MG tablet      Take 325 mg by mouth daily        atorvastatin 20 MG tablet    LIPITOR     Take 20 mg by mouth daily        fish Oil 1200 MG capsule      Take 1,200 mg by mouth daily        GAS-X PO      Take 125 mg by mouth 3 times daily        metoprolol succinate 25 MG 24 hr tablet    TOPROL-XL     Take 25  mg by mouth daily

## 2018-06-19 ENCOUNTER — ALLIED HEALTH/NURSE VISIT (OUTPATIENT)
Dept: RADIATION ONCOLOGY | Facility: HOSPITAL | Age: 73
End: 2018-06-19

## 2018-06-19 DIAGNOSIS — C61 PROSTATE CANCER (H): Primary | ICD-10-CM

## 2018-06-19 PROCEDURE — 77385 ZZH IMRT TREATMENT DELIVERY, SIMPLE: CPT | Performed by: RADIOLOGY

## 2018-06-19 NOTE — MR AVS SNAPSHOT
After Visit Summary   6/19/2018    Steven Lopez    MRN: [id number]           Patient Information     Date Of Birth          1945        Visit Information        Provider Department      6/19/2018 1:15 PM [facility] Radiation Oncology        Today's Diagnoses     Prostate cancer (H)    -  1       Follow-ups after your visit        Your next 10 appointments already scheduled     Jun 20, 2018  1:15 PM CDT   Treatment with HI CL[facility] IX   HI Radiation Oncology ([facility] )    [address] [id number] [phone]            Jun 20, 2018  1:30 PM CDT   on treatment visit with Noel Ballesteros MD   HI Radiation Oncology ([facility] )    [address] [address] [phone]            Jun 21, 2018  1:15 PM CDT   Treatment with HI CLIN[facility] IX   HI Radiation Oncology ([facility] )    [address] [address]   [phone]            Jun 22, 2018  1:15 PM CDT   Treatment with HI CLINAC IX   HI Radiation Oncology ([facility] )    [address] [address]   [phone]            Jun 25, 2018  1:15 PM CDT   Treatment with HI CLINAC IX   HI Radiation Oncology ([facility] )    [address] [address]   [phone]            Jun 26, 2018  1:15 PM CDT   Treatment with HI CLINAC IX   HI Radiation Oncology ([facility] )    [address] [address]   [phone]            Jun 27, 2018  1:15 PM CDT   Treatment with HI CLINAC IX   HI Radiation Oncology ([facility] )    [address] [address]   [phone]            Jun 27, 2018  1:30 PM CDT   on treatment visit with Noel Ballesteros MD   [facility] Radiation Oncology ([facility] )    [address] [id number] [phone]            Jun 28, 2018  1:15 PM CDT   Treatment with HI CLINAC IX   HI Radiation  "Oncology ([facility] )    [address] [address] [phone]            2018  1:15 PM CDT   Treatment with HI CLINAC IX   HI Radiation Oncology ([facility] )    [address] [address] [phone]              Who to contact     If you have questions or need follow up information about today's clinic visit or your schedule please contact [facility] directly at [phone].  Normal or non-critical lab and imaging results will be communicated to you by MyChart, letter or phone within 4 business days after the clinic has received the results. If you do not hear from us within 7 days, please contact the clinic through [email or web address] or phone. If you have a critical or abnormal lab result, we will notify you by phone as soon as possible.  Submit refill requests through Odoo (formerly OpenERP) or call your pharmacy and they will forward the refill request to us. Please allow 3 business days for your refill to be completed.          Additional Information About Your Visit        Odoo (formerly OpenERP) Information     Odoo (formerly OpenERP) lets you send messages to your doctor, view your test results, renew your prescriptions, schedule appointments and more. To sign up, go to [email or web address] (formerly [email or web address]) . Click on \"Log in\" on the left side of the screen, which will take you to the Welcome page. Then click on \"Sign up Now\" on the right side of the page.     You will be asked to enter the access code listed below, as well as some personal information. Please follow the directions to create your username and password.     Your access code is: [email or web address]  Expires: 2018  2:55 PM     Your access code will  in 90 days. If you need help or a new code, please call your [place] clinic or [phone].        Care EveryWhere ID     This is your Care EveryWhere ID. This could be used by other organizations to access your [place] medical records  [id number]         Blood Pressure from " Last 3 Encounters:   06/13/18 138/82   06/06/18 130/84   05/30/18 152/86    Weight from Last 3 Encounters:   06/13/18 91.2 kg (201 lb)   06/06/18 92.1 kg (203 lb)   05/30/18 91.6 kg (202 lb)              Today, you had the following     No orders found for display       Primary Care Provider Office Phone # Fax #    Sabina Flanagan, [phone]       [place] [address] [address] [facility] [address]        Equal Access to Services     [facility]: Hadii charlie ku hadasho Soomaali, waaxda luqadaha, qaybta kaalmada adeegyaabel, steven victoria . So [facility] [phone].    ATENCIÓN: Si habla español, tiene a bergman disposición servicios gratuitos de asistencia lingüística. [facility] [phone].    We comply with applicable federal civil rights laws and [place] laws. We do not discriminate on the basis of race, color, national origin, age, disability, sex, sexual orientation, or gender identity.            Thank you!     Thank you for choosing [facility]  for your care. Our goal is always to provide you with excellent care. Hearing back from our patients is one way we can continue to improve our services. Please take a few minutes to complete the written survey that you may receive in the mail after your visit with us. Thank you!             Your Updated Medication List - Protect others around you: Learn how to safely use, store and throw away your medicines at www.disposemymeds.org.          This list is accurate as of 6/19/18  1:22 PM.  Always use your most recent med list.                   Brand Name Dispense Instructions for use Diagnosis    aspirin 325 MG tablet      Take 325 mg by mouth daily        atorvastatin 20 MG tablet    LIPITOR     Take 20 mg by mouth daily        fish Oil 1200 MG capsule      Take 1,200 mg by mouth daily        GAS-X PO      Take 125 mg by mouth 3 times daily        metoprolol succinate 25 MG 24 hr tablet    TOPROL-XL     Take 25  mg by mouth daily

## 2018-06-19 NOTE — PROGRESS NOTES
Steven Lopez received radiation therapy treatment today 06/19/18.    Ever Lr  June 19, 2018  1:14 PM

## 2018-06-20 ENCOUNTER — OFFICE VISIT (OUTPATIENT)
Dept: RADIATION ONCOLOGY | Facility: HOSPITAL | Age: 73
End: 2018-06-20

## 2018-06-20 ENCOUNTER — ALLIED HEALTH/NURSE VISIT (OUTPATIENT)
Dept: RADIATION ONCOLOGY | Facility: HOSPITAL | Age: 73
End: 2018-06-20

## 2018-06-20 VITALS
HEART RATE: 52 BPM | WEIGHT: 202 LBS | RESPIRATION RATE: 16 BRPM | SYSTOLIC BLOOD PRESSURE: 176 MMHG | DIASTOLIC BLOOD PRESSURE: 80 MMHG | BODY MASS INDEX: 30.71 KG/M2

## 2018-06-20 DIAGNOSIS — C61 PROSTATE CANCER (H): Primary | ICD-10-CM

## 2018-06-20 PROCEDURE — 77385 ZZH IMRT TREATMENT DELIVERY, SIMPLE: CPT | Performed by: RADIOLOGY

## 2018-06-20 PROCEDURE — 77336 RADIATION PHYSICS CONSULT: CPT | Performed by: RADIOLOGY

## 2018-06-20 ASSESSMENT — PAIN SCALES - GENERAL: PAINLEVEL: NO PAIN (0)

## 2018-06-20 NOTE — PROGRESS NOTES
Steven Lopez received radiation therapy treatment today 06/20/18.    Trey Leyva  June 20, 2018  1:17 PM

## 2018-06-20 NOTE — MR AVS SNAPSHOT
After Visit Summary   6/20/2018    Steven Lopez    MRN: [id number]           Patient Information     Date Of Birth          1945        Visit Information        Provider Department      6/20/2018 1:30 PM Noel Ballesteros MD [facility]        Today's Diagnoses     Prostate cancer (H)    -  1       Follow-ups after your visit        Your next 10 appointments already scheduled     Jun 21, 2018  1:15 PM CDT   Treatment with [facility]   [facility] ([facility] )    [address] [address] [phone]            Jun 22, 2018  1:15 PM CDT   Treatment with [facility]   [facility] ([facility] )    [address] [address]   [phone]            Jun 25, 2018  1:15 PM CDT   Treatment with [facility]   [facility] ([facility] )    [address] [address]   [phone]            Jun 26, 2018  1:15 PM CDT   Treatment with [facility]   [facility] ([facility] )    [address] [address]   [phone]            Jun 27, 2018  1:15 PM CDT   Treatment with [facility]   [facility] ([facility] )    [address] [address]   [phone]            Jun 27, 2018  1:30 PM CDT   on treatment visit with Noel Ballesteros MD   [facility] ([facility] )    [address] [address]   [phone]            Jun 28, 2018  1:15 PM CDT   Treatment with [facility]   [facility] ([facility] )    [address] [address]   [phone]            Jun 29, 2018  1:15 PM CDT   Treatment with [facility]   [facility] ([facility] )    [address] [address]   [phone]            Jul 02, 2018  1:15 PM CDT   Treatment with [facility]   [facility] Oncology  "([facility] )    [address] [address] [phone]            2018  1:15 PM CDT   Treatment with [facility]   [facility] Radiation Oncology ([facility] )    [address] [address] [phone]              Who to contact     If you have questions or need follow up information about today's clinic visit or your schedule please contact [facility] directly at [phone].  Normal or non-critical lab and imaging results will be communicated to you by MyChart, letter or phone within 4 business days after the clinic has received the results. If you do not hear from us within 7 days, please contact the clinic through [employer] or phone. If you have a critical or abnormal lab result, we will notify you by phone as soon as possible.  Submit refill requests through YOUnite or call your pharmacy and they will forward the refill request to us. Please allow 3 business days for your refill to be completed.          Additional Information About Your Visit        YOUnite Information     YOUnite lets you send messages to your doctor, view your test results, renew your prescriptions, schedule appointments and more. To sign up, go to [email or web address] . Click on \"Log in\" on the left side of the screen, which will take you to the Welcome page. Then click on \"Sign up Now\" on the right side of the page.     You will be asked to enter the access code listed below, as well as some personal information. Please follow the directions to create your username and password.     Your access code is: [email or web address]  Expires: 2018  2:55 PM     Your access code will  in 90 days. If you need help or a new code, please call your [place] clinic or [phone].        Care EveryWhere ID     This is your Care EveryWhere ID. This could be used by other organizations to access your [place] medical records  [id number]        Your Vitals Were     Pulse " Respirations BMI (Body Mass Index)             52 16 30.71 kg/m2          Blood Pressure from Last 3 Encounters:   06/20/18 176/80   06/13/18 138/82   06/06/18 130/84    Weight from Last 3 Encounters:   06/20/18 91.6 kg (202 lb)   06/13/18 91.2 kg (201 lb)   06/06/18 92.1 kg (203 lb)              Today, you had the following     No orders found for display       Primary Care Provider Office Phone # Fax #    Sabina KENDAL Flanagan [phone]       [place] [address] [address] [facility] [address]        Equal Access to Services     [facility]: Hadii charlie Manzano, wasarah stark, qaybta kaalmada michelle, steven victoria . So [facility] [phone].    ATENCIÓN: Si habla español, tiene a bergman disposición servicios gratuitos de asistencia lingüística. [facility] [phone].    We comply with applicable federal civil rights laws and [place] laws. We do not discriminate on the basis of race, color, national origin, age, disability, sex, sexual orientation, or gender identity.            Thank you!     Thank you for choosing [facility]  for your care. Our goal is always to provide you with excellent care. Hearing back from our patients is one way we can continue to improve our services. Please take a few minutes to complete the written survey that you may receive in the mail after your visit with us. Thank you!             Your Updated Medication List - Protect others around you: Learn how to safely use, store and throw away your medicines at www.disposemymeds.org.          This list is accurate as of 6/20/18  2:39 PM.  Always use your most recent med list.                   Brand Name Dispense Instructions for use Diagnosis    aspirin 325 MG tablet      Take 325 mg by mouth daily        atorvastatin 20 MG tablet    LIPITOR     Take 20 mg by mouth daily        fish Oil 1200 MG capsule      Take 1,200 mg by mouth daily        GAS-X PO      Take 125 mg  by mouth 3 times daily        metoprolol succinate 25 MG 24 hr tablet    TOPROL-XL     Take 25 mg by mouth daily

## 2018-06-20 NOTE — MR AVS SNAPSHOT
After Visit Summary   6/20/2018    Steven Lopez    MRN: [id number]           Patient Information     Date Of Birth          1945        Visit Information        Provider Department      6/20/2018 1:15 PM [facility]        Today's Diagnoses     Prostate cancer (H)    -  1       Follow-ups after your visit        Your next 10 appointments already scheduled     Jun 20, 2018  1:30 PM CDT   on treatment visit with Noel Ballesteros MD   [facility] ([facility] )    [address] [id number] [phone]            Jun 21, 2018  1:15 PM CDT   Treatment with [facility]   [facility] ([facility] )    [address] [address]   [phone]            Jun 22, 2018  1:15 PM CDT   Treatment with [facility]   [facility] ([facility] )    [address] [address]   [phone]            Jun 25, 2018  1:15 PM CDT   Treatment with [facility]   [facility] ([facility] )    [address] [address]   [phone]            Jun 26, 2018  1:15 PM CDT   Treatment with [facility]   [facility] ([facility] )    [address] [address]   [phone]            Jun 27, 2018  1:15 PM CDT   Treatment with [facility]   [facility] ([facility] )    [address] [address] [phone]            Jun 27, 2018  1:30 PM CDT   on treatment visit with Noel Ballesteros MD   [facility] ([facility] )    [address] [address]   [phone]            Jun 28, 2018  1:15 PM CDT   Treatment with [facility]   [facility] ([facility] )    [address] [id number] [phone]            Jun 29, 2018  1:15 PM CDT   Treatment with [facility] IX   [facility] Radiation  "Oncology ([facility] )    [address] [address] [phone]            2018  1:15 PM CDT   Treatment with [facility]   [facility] Radiation Oncology ([facility] )    [address] [address] [phone]              Who to contact     If you have questions or need follow up information about today's clinic visit or your schedule please contact [facility] directly at [phone].  Normal or non-critical lab and imaging results will be communicated to you by MyChart, letter or phone within 4 business days after the clinic has received the results. If you do not hear from us within 7 days, please contact the clinic through [employer] or phone. If you have a critical or abnormal lab result, we will notify you by phone as soon as possible.  Submit refill requests through Squabbler or call your pharmacy and they will forward the refill request to us. Please allow 3 business days for your refill to be completed.          Additional Information About Your Visit        Squabbler Information     Squabbler lets you send messages to your doctor, view your test results, renew your prescriptions, schedule appointments and more. To sign up, go to [email or web address] . Click on \"Log in\" on the left side of the screen, which will take you to the Welcome page. Then click on \"Sign up Now\" on the right side of the page.     You will be asked to enter the access code listed below, as well as some personal information. Please follow the directions to create your username and password.     Your access code is: [email or web address]  Expires: 2018  2:55 PM     Your access code will  in 90 days. If you need help or a new code, please call your [place] clinic or [phone].        Care EveryWhere ID     This is your Care EveryWhere ID. This could be used by other organizations to access your [place] medical records  [id number]         Blood Pressure from " Last 3 Encounters:   06/13/18 138/82   06/06/18 130/84   05/30/18 152/86    Weight from Last 3 Encounters:   06/13/18 91.2 kg (201 lb)   06/06/18 92.1 kg (203 lb)   05/30/18 91.6 kg (202 lb)              Today, you had the following     No orders found for display       Primary Care Provider Office Phone # Fax #    Sabina Flanagan, [phone]       [place] [address] [address] [facility] [address]        Equal Access to Services     [facility]: Hadii charlie ku hadasho Soomaali, waaxda luqadaha, qaybta kaalmada adeegyaabel, steven victoria . So [facility] [phone].    ATENCIÓN: Si habla español, tiene a bergman disposición servicios gratuitos de asistencia lingüística. [facility] [phone].    We comply with applicable federal civil rights laws and [place] laws. We do not discriminate on the basis of race, color, national origin, age, disability, sex, sexual orientation, or gender identity.            Thank you!     Thank you for choosing [facility]  for your care. Our goal is always to provide you with excellent care. Hearing back from our patients is one way we can continue to improve our services. Please take a few minutes to complete the written survey that you may receive in the mail after your visit with us. Thank you!             Your Updated Medication List - Protect others around you: Learn how to safely use, store and throw away your medicines at www.disposemymeds.org.          This list is accurate as of 6/20/18  1:25 PM.  Always use your most recent med list.                   Brand Name Dispense Instructions for use Diagnosis    aspirin 325 MG tablet      Take 325 mg by mouth daily        atorvastatin 20 MG tablet    LIPITOR     Take 20 mg by mouth daily        fish Oil 1200 MG capsule      Take 1,200 mg by mouth daily        GAS-X PO      Take 125 mg by mouth 3 times daily        metoprolol succinate 25 MG 24 hr tablet    TOPROL-XL     Take 25  mg by mouth daily

## 2018-06-20 NOTE — PROGRESS NOTES
Service Date: 2018      RADIATION THERAPY PROGRESS NOTE      REFERRING PROVIDERS:  Jaiden Almodovar MD;  Daniel Matthews DO; Sabina Flanagan DNP      DIAGNOSIS:  Prostate carcinoma, Dorys 3+3 with marked acceleration of PSA, currently 9.95.      RADIATION THERAPY:  The patient has received 5700 cGy to date for treatment of a histologically low-grade prostate carcinoma, but with a marked increase in PSA.      SUBJECTIVE:  Doing quite well.  He does have a bit of increased rectal urgency with very little change in bowel movements per day.  Also a bit of dysuria occasionally, nocturia maximally two times per night.      OBJECTIVE:  Weight 202 pounds.  Abdomen is benign.  Bowel sounds present.      IMPRESSION:  Routine tolerance to radiation therapy for prostate carcinoma.      PLAN:  Continue treatment as planned.         RANULFO TAPIA MD             D: 2018   T: 2018   MT: CORY      Name:     ROLDAN MARQUES   MRN:      [id number]        Account:      [id number]   :      1945           Service Date: 2018      Document: [id number]       cc: Sabnia Matthews DO

## 2018-06-21 ENCOUNTER — ALLIED HEALTH/NURSE VISIT (OUTPATIENT)
Dept: RADIATION ONCOLOGY | Facility: HOSPITAL | Age: 73
End: 2018-06-21

## 2018-06-21 DIAGNOSIS — C61 PROSTATE CANCER (H): Primary | ICD-10-CM

## 2018-06-21 PROCEDURE — 77385 ZZH IMRT TREATMENT DELIVERY, SIMPLE: CPT | Performed by: RADIOLOGY

## 2018-06-21 NOTE — MR AVS SNAPSHOT
After Visit Summary   6/21/2018    Steven Lopez    MRN: [id number]           Patient Information     Date Of Birth          1945        Visit Information        Provider Department      6/21/2018 1:15 PM [facility]        Today's Diagnoses     Prostate cancer (H)    -  1       Follow-ups after your visit        Your next 10 appointments already scheduled     Jun 22, 2018  1:15 PM CDT   Treatment with [facility]   [facility] Radiation Oncology ([facility] )    [address] [address] [phone]            Jun 25, 2018  1:15 PM CDT   Treatment with [facility]   [facility] ([facility] )    [address] [address] [phone]            Jun 26, 2018  1:15 PM CDT   Treatment with [facility] CL[facility] IX   [facility] ([facility] )    [address] [address]   [phone]            Jun 27, 2018  1:15 PM CDT   Treatment with [facility] IX   [facility] ([facility] )    [address] [address] [phone]            Jun 27, 2018  1:30 PM CDT   on treatment visit with Noel Ballesteros MD   [facility] ([facility] )    [address] [address] [phone]            Jun 28, 2018  1:15 PM CDT   Treatment with [facility] CL[facility] IX   [facility] Radiation Oncology ([facility] )    [address] [address] [phone]            Jun 29, 2018  1:15 PM CDT   Treatment with [facility] CL[facility] IX   [facility] Oncology ([facility] )    [address] [address] [phone]            Jul 02, 2018  1:15 PM CDT   Treatment with [facility] CL[facility] IX   [facility] Radiation Oncology ([facility] )    [address] [address] [phone]            Jul 03, 2018  1:15 PM CDT   Treatment with HI CLIN[facility] IX   [facility] Radiation Oncology ([place]  [facility] )    [address] [address] [phone]            2018  1:15 PM CDT   Treatment with [facility] CL[facility] IX   [facility] Radiation Oncology ([facility] )    [address] [address] [phone]              Who to contact     If you have questions or need follow up information about today's clinic visit or your schedule please contact [facility] directly at [phone].  Normal or non-critical lab and imaging results will be communicated to you by Kimeltuhart, letter or phone within 4 business days after the clinic has received the results. If you do not hear from us within 7 days, please contact the clinic through Kimeltuhart or phone. If you have a critical or abnormal lab result, we will notify you by phone as soon as possible.  Submit refill requests through [employer] or call your pharmacy and they will forward the refill request to us. Please allow 3 business days for your refill to be completed.          Additional Information About Your Visit        [facility] [facility] Information     [facility] lets you send messages to your doctor, view your test results, renew your prescriptions, schedule appointments and more. To sign up, go to [email or web address] . Click on \"Log in\" on the left side of the screen, which will take you to the Welcome page. Then click on \"Sign up Now\" on the right side of the page.     You will be asked to enter the access code listed below, as well as some personal information. Please follow the directions to create your username and password.     Your access code is: [email or web address]  Expires: 2018  2:55 PM     Your access code will  in 90 days. If you need help or a new code, please call your [place] clinic or [phone].        Care EveryWhere ID     This is your Care EveryWhere ID. This could be used by other organizations to access your [place] medical records  [id number]         Blood Pressure from Last 3 " Encounters:   06/20/18 176/80   06/13/18 138/82   06/06/18 130/84    Weight from Last 3 Encounters:   06/20/18 91.6 kg (202 lb)   06/13/18 91.2 kg (201 lb)   06/06/18 92.1 kg (203 lb)              Today, you had the following     No orders found for display       Primary Care Provider Office Phone # Fax #    Sabina Flanagan, [phone]       [place] [address] [address] [facility] [address]        Equal Access to Services     [facility]: Hadii charlie ku hadasho Soomaali, waaxda luqadaha, qaybta kaalmada adeegyaabel, steven victoria . So [facility] [phone].    ATENCIÓN: Si habla español, tiene a bergman disposición servicios gratuitos de asistencia lingüística. [facility] [phone].    We comply with applicable federal civil rights laws and [place] laws. We do not discriminate on the basis of race, color, national origin, age, disability, sex, sexual orientation, or gender identity.            Thank you!     Thank you for choosing [facility]  for your care. Our goal is always to provide you with excellent care. Hearing back from our patients is one way we can continue to improve our services. Please take a few minutes to complete the written survey that you may receive in the mail after your visit with us. Thank you!             Your Updated Medication List - Protect others around you: Learn how to safely use, store and throw away your medicines at www.disposemymeds.org.          This list is accurate as of 6/21/18  1:39 PM.  Always use your most recent med list.                   Brand Name Dispense Instructions for use Diagnosis    aspirin 325 MG tablet      Take 325 mg by mouth daily        atorvastatin 20 MG tablet    LIPITOR     Take 20 mg by mouth daily        fish Oil 1200 MG capsule      Take 1,200 mg by mouth daily        GAS-X PO      Take 125 mg by mouth 3 times daily        metoprolol succinate 25 MG 24 hr tablet    TOPROL-XL     Take 25 mg by  mouth daily

## 2018-06-21 NOTE — PROGRESS NOTES
Steven Lopez received radiation therapy treatment today 06/21/18.    Satnam Moise  June 21, 2018  1:21 PM

## 2018-06-22 ENCOUNTER — ALLIED HEALTH/NURSE VISIT (OUTPATIENT)
Dept: RADIATION ONCOLOGY | Facility: HOSPITAL | Age: 73
End: 2018-06-22

## 2018-06-22 DIAGNOSIS — C61 PROSTATE CANCER (H): Primary | ICD-10-CM

## 2018-06-22 PROCEDURE — 77385 ZZH IMRT TREATMENT DELIVERY, SIMPLE: CPT | Performed by: RADIOLOGY

## 2018-06-22 NOTE — MR AVS SNAPSHOT
After Visit Summary   6/22/2018    Steven Lopez    MRN: [id number]           Patient Information     Date Of Birth          1945        Visit Information        Provider Department      6/22/2018 1:15 PM [facility]        Today's Diagnoses     Prostate cancer (H)    -  1       Follow-ups after your visit        Your next 10 appointments already scheduled     Jun 25, 2018  1:15 PM CDT   Treatment with [facility]   [facility] Radiation Oncology ([facility] )    [address] [address]   [phone]            Jun 26, 2018  1:15 PM CDT   Treatment with [facility]   [facility] Radiation Oncology ([facility] )    [address] [address]   [phone]            Jun 27, 2018  1:15 PM CDT   Treatment with [facility]   [facility] Radiation Oncology ([facility] )    [address] [address]   [phone]            Jun 27, 2018  1:30 PM CDT   on treatment visit with Noel Ballesteros MD   [facility] Radiation Oncology ([facility] )    [address] [address]   [phone]            Jun 28, 2018  1:15 PM CDT   Treatment with [facility]   [facility] Radiation Oncology ([facility] )    [address] [address]   [phone]            Jun 29, 2018  1:15 PM CDT   Treatment with [facility]   [facility] Radiation Oncology ([facility] )    [address] [address]   [phone]            Jul 02, 2018  1:15 PM CDT   Treatment with [facility]   [facility] Radiation Oncology ([facility] )    [address] [address]   [phone]            Jul 03, 2018  1:15 PM CDT   Treatment with [facility]   [facility] Radiation Oncology ([facility] )    [address] [address]   [phone]            Jul 05, 2018  1:15 PM CDT   Treatment with [facility]   HI Radiation Oncology ([facility] )    [address] [address] [phone]            2018  1:15 PM CDT   Treatment with [facility]   HI Radiation Oncology ([facility] )    [address] [address] [phone]              Who to contact     If you have questions or need follow up information about today's clinic visit or your schedule please contact [facility] directly at [phone].  Normal or non-critical lab and imaging results will be communicated to you by Powncehart, letter or phone within 4 business days after the clinic has received the results. If you do not hear from us within 7 days, please contact the clinic through Powncehart or phone. If you have a critical or abnormal lab result, we will notify you by phone as soon as possible.  Submit refill requests through "Mobile Location, IP" or call your pharmacy and they will forward the refill request to us. Please allow 3 business days for your refill to be completed.          Additional Information About Your Visit        PownceharMeshApp Information     "Mobile Location, IP" lets you send messages to your doctor, view your test results, renew your prescriptions, schedule appointments and more. To sign up, go to [email or web address]" . Click on \"Log in\" on the left side of the screen, which will take you to the Welcome page. Then click on \"Sign up Now\" on the right side of the page.     You will be asked to enter the access code listed below, as well as some personal information. Please follow the directions to create your username and password.     Your access code is: [email or web address]  Expires: 2018  2:55 PM     Your access code will  in 90 days. If you need help or a new code, please call your [place] clinic or [phone].        Care EveryWhere ID     This is your Care EveryWhere ID. This could be used by other organizations to access your [place] medical records  [id number]         Blood Pressure from Last 3 " Encounters:   06/20/18 176/80   06/13/18 138/82   06/06/18 130/84    Weight from Last 3 Encounters:   06/20/18 91.6 kg (202 lb)   06/13/18 91.2 kg (201 lb)   06/06/18 92.1 kg (203 lb)              Today, you had the following     No orders found for display       Primary Care Provider Office Phone # Fax #    Sabina Flanagan, [phone]       [place] [address] [address] [facility] [address]        Equal Access to Services     [facility]: Hadii charlie ku hadasho Soomaali, waaxda luqadaha, qaybta kaalmada adeegyaabel, steven victoria . So [facility] [phone].    ATENCIÓN: Si habla español, tiene a bergman disposición servicios gratuitos de asistencia lingüística. [facility] [phone].    We comply with applicable federal civil rights laws and [place] laws. We do not discriminate on the basis of race, color, national origin, age, disability, sex, sexual orientation, or gender identity.            Thank you!     Thank you for choosing [facility]  for your care. Our goal is always to provide you with excellent care. Hearing back from our patients is one way we can continue to improve our services. Please take a few minutes to complete the written survey that you may receive in the mail after your visit with us. Thank you!             Your Updated Medication List - Protect others around you: Learn how to safely use, store and throw away your medicines at www.disposemymeds.org.          This list is accurate as of 6/22/18  1:25 PM.  Always use your most recent med list.                   Brand Name Dispense Instructions for use Diagnosis    aspirin 325 MG tablet      Take 325 mg by mouth daily        atorvastatin 20 MG tablet    LIPITOR     Take 20 mg by mouth daily        fish Oil 1200 MG capsule      Take 1,200 mg by mouth daily        GAS-X PO      Take 125 mg by mouth 3 times daily        metoprolol succinate 25 MG 24 hr tablet    TOPROL-XL     Take 25 mg by  mouth daily

## 2018-06-22 NOTE — PROGRESS NOTES
Steven Lopez received radiation therapy treatment today 06/22/18.    Renetta Hughes  June 22, 2018  1:16 PM

## 2018-06-25 ENCOUNTER — ALLIED HEALTH/NURSE VISIT (OUTPATIENT)
Dept: RADIATION ONCOLOGY | Facility: HOSPITAL | Age: 73
End: 2018-06-25

## 2018-06-25 DIAGNOSIS — C61 PROSTATE CANCER (H): Primary | ICD-10-CM

## 2018-06-25 PROCEDURE — 77385 ZZH IMRT TREATMENT DELIVERY, SIMPLE: CPT | Performed by: RADIOLOGY

## 2018-06-25 NOTE — PROGRESS NOTES
Steven Lopez received radiation therapy treatment today 06/25/18.    Kelly Pantoja  June 25, 2018  1:22 PM

## 2018-06-25 NOTE — MR AVS SNAPSHOT
After Visit Summary   6/25/2018    Steven Lopez    MRN: [id number]           Patient Information     Date Of Birth          1945        Visit Information        Provider Department      6/25/2018 1:15 PM [facility] Rad[facility] Oncology        Today's Diagnoses     Prostate cancer (H)    -  1       Follow-ups after your visit        Your next 10 appointments already scheduled     Jun 26, 2018  1:15 PM CDT   Treatment with [facility]   HI Radiation Oncology ([facility] )    [address] [address] [phone]            Jun 27, 2018  1:15 PM CDT   Treatment with [facility]   [facility] Radiation Oncology ([facility] )    [address] [address] [phone]            Jun 27, 2018  1:30 PM CDT   on treatment visit with Noel Ballesteros MD   HI Radiation Oncology ([facility] )    [address] [address]   [phone]            Jun 28, 2018  1:15 PM CDT   Treatment with [facility]   [facility] Radiation Oncology ([facility] )    [address] [address]   [phone]            Jun 29, 2018  1:15 PM CDT   Treatment with [facility]   [facility] Radiation Oncology ([facility] )    [address] [address]   [phone]            Jul 02, 2018  1:15 PM CDT   Treatment with [facility]   [facility] Radiation Oncology ([facility] )    [address] [address]   [phone]            Jul 03, 2018  1:15 PM CDT   Treatment with [facility]   [facility] Radiation Oncology ([facility] )    [address] [address]   [phone]            Jul 03, 2018  1:30 PM CDT   on treatment visit with Noel Ballesteros MD   HI Radiation Oncology ([facility] )    [address] [address]   [phone]            Jul 05, 2018  1:15 PM CDT   Treatment with [facility]   HI Radiation  "Oncology ([facility] )    [address] [address] [phone]            2018  1:15 PM CDT   Treatment with [facility] CL[facility] IX   HI Radiation Oncology ([facility] )    [address] [address] [phone]              Who to contact     If you have questions or need follow up information about today's clinic visit or your schedule please contact [facility] directly at [phone].  Normal or non-critical lab and imaging results will be communicated to you by MyChart, letter or phone within 4 business days after the clinic has received the results. If you do not hear from us within 7 days, please contact the clinic through Absynth Biologicshart or phone. If you have a critical or abnormal lab result, we will notify you by phone as soon as possible.  Submit refill requests through ViClone or call your pharmacy and they will forward the refill request to us. Please allow 3 business days for your refill to be completed.          Additional Information About Your Visit        ViClone Information     ViClone lets you send messages to your doctor, view your test results, renew your prescriptions, schedule appointments and more. To sign up, go to [email or web address] . Click on \"Log in\" on the left side of the screen, which will take you to the Welcome page. Then click on \"Sign up Now\" on the right side of the page.     You will be asked to enter the access code listed below, as well as some personal information. Please follow the directions to create your username and password.     Your access code is: [email or web address]  Expires: 2018  2:55 PM     Your access code will  in 90 days. If you need help or a new code, please call your [place] clinic or [phone].        Care EveryWhere ID     This is your Care EveryWhere ID. This could be used by other organizations to access your [place] medical records  [id number]         Blood Pressure from " Last 3 Encounters:   06/20/18 176/80   06/13/18 138/82   06/06/18 130/84    Weight from Last 3 Encounters:   06/20/18 91.6 kg (202 lb)   06/13/18 91.2 kg (201 lb)   06/06/18 92.1 kg (203 lb)              Today, you had the following     No orders found for display       Primary Care Provider Office Phone # Fax #    Sabina Flanagan, [phone]       [place] [address] [address] [facility] [address]        Equal Access to Services     [facility]: Hadii charlie ku hadasho Soomaali, waaxda luqadaha, qaybta kaalmada adeegyaabel, steven victoria . So [facility] [phone].    ATENCIÓN: Si habla español, tiene a bergman disposición servicios gratuitos de asistencia lingüística. [facility] [phone].    We comply with applicable federal civil rights laws and [place] laws. We do not discriminate on the basis of race, color, national origin, age, disability, sex, sexual orientation, or gender identity.            Thank you!     Thank you for choosing [facility]  for your care. Our goal is always to provide you with excellent care. Hearing back from our patients is one way we can continue to improve our services. Please take a few minutes to complete the written survey that you may receive in the mail after your visit with us. Thank you!             Your Updated Medication List - Protect others around you: Learn how to safely use, store and throw away your medicines at www.disposemymeds.org.          This list is accurate as of 6/25/18  1:28 PM.  Always use your most recent med list.                   Brand Name Dispense Instructions for use Diagnosis    aspirin 325 MG tablet      Take 325 mg by mouth daily        atorvastatin 20 MG tablet    LIPITOR     Take 20 mg by mouth daily        fish Oil 1200 MG capsule      Take 1,200 mg by mouth daily        GAS-X PO      Take 125 mg by mouth 3 times daily        metoprolol succinate 25 MG 24 hr tablet    TOPROL-XL     Take 25  mg by mouth daily

## 2018-06-26 ENCOUNTER — ALLIED HEALTH/NURSE VISIT (OUTPATIENT)
Dept: RADIATION ONCOLOGY | Facility: HOSPITAL | Age: 73
End: 2018-06-26

## 2018-06-26 DIAGNOSIS — C61 PROSTATE CANCER (H): Primary | ICD-10-CM

## 2018-06-26 PROCEDURE — 77385 ZZH IMRT TREATMENT DELIVERY, SIMPLE: CPT | Performed by: RADIOLOGY

## 2018-06-26 NOTE — PROGRESS NOTES
Steven Lopez received radiation therapy treatment today 06/26/18.    Satnam Moise  June 26, 2018  1:20 PM

## 2018-06-26 NOTE — MR AVS SNAPSHOT
After Visit Summary   6/26/2018    Steven Lopez    MRN: [id number]           Patient Information     Date Of Birth          1945        Visit Information        Provider Department      6/26/2018 1:15 PM [facility] Rad[facility] Oncology        Today's Diagnoses     Prostate cancer (H)    -  1       Follow-ups after your visit        Your next 10 appointments already scheduled     Jun 27, 2018  1:15 PM CDT   Treatment with [facility]   [facility] Radiation Oncology ([facility] )    [address] [address]   [phone]            Jun 27, 2018  1:30 PM CDT   on treatment visit with Noel Ballesteros MD   HI Radiation Oncology ([facility] )    [address] [address]   [phone]            Jun 28, 2018  1:15 PM CDT   Treatment with [facility]   [facility] Radiation Oncology ([facility] )    [address] [address]   [phone]            Jun 29, 2018  1:15 PM CDT   Treatment with [facility]   HI Radiation Oncology ([facility] )    [address] [address]   [phone]            Jul 02, 2018  1:15 PM CDT   Treatment with [facility] CL[facility] IX   HI Radiation Oncology ([facility] )    [address] [address]   [phone]            Jul 03, 2018  1:15 PM CDT   Treatment with [facility] CL[facility] IX   [facility] Radiation Oncology ([facility] )    [address] [address]   [phone]            Jul 03, 2018  1:30 PM CDT   on treatment visit with Noel Ballesteros MD   HI Radiation Oncology ([facility] )    [address] [address]   [phone]            Jul 05, 2018  1:15 PM CDT   Treatment with [facility]   [facility] Radiation Oncology ([facility] )    [address] [address]   [phone]            Jul 06, 2018  1:15 PM CDT   Treatment with HI CL[facility] IX   HI Radiation  "Oncology ([facility] )    [address] [place] [id number] [phone]              Who to contact     If you have questions or need follow up information about today's clinic visit or your schedule please contact [facility] directly at [phone].  Normal or non-critical lab and imaging results will be communicated to you by MyChart, letter or phone within 4 business days after the clinic has received the results. If you do not hear from us within 7 days, please contact the clinic through MyChart or phone. If you have a critical or abnormal lab result, we will notify you by phone as soon as possible.  Submit refill requests through Itugo or call your pharmacy and they will forward the refill request to us. Please allow 3 business days for your refill to be completed.          Additional Information About Your Visit        MyChart Information     Itugo lets you send messages to your doctor, view your test results, renew your prescriptions, schedule appointments and more. To sign up, go to [email or web address] . Click on \"Log in\" on the left side of the screen, which will take you to the Welcome page. Then click on \"Sign up Now\" on the right side of the page.     You will be asked to enter the access code listed below, as well as some personal information. Please follow the directions to create your username and password.     Your access code is: [email or web address]  Expires: 2018  2:55 PM     Your access code will  in 90 days. If you need help or a new code, please call your [place] clinic or [phone].        Care EveryWhere ID     This is your Care EveryWhere ID. This could be used by other organizations to access your [place] medical records  [id number]         Blood Pressure from Last 3 Encounters:   18 176/80   18 138/82   18 130/84    Weight from Last 3 Encounters:   18 91.6 kg (202 lb)   18 91.2 kg (201 lb)   18 92.1 " kg (203 lb)              Today, you had the following     No orders found for display       Primary Care Provider Office Phone # Fax #    Sabina FlanaganKENDAL [phone]       [place] [address] [address] [facility] [address]        Equal Access to Services     [facility] : Hadii aad ku hadhongo Soomaali, waaxda luqadaha, qaybta kaalmada adeegyada, waxay idiin hayviviann adetarah bowers julien lei. So [facility] [phone].    ATENCIÓN: Si habla español, tiene a bergman disposición servicios gratuitos de asistencia lingüística. Llame al [phone].    We comply with applicable federal civil rights laws and [place] laws. We do not discriminate on the basis of race, color, national origin, age, disability, sex, sexual orientation, or gender identity.            Thank you!     Thank you for choosing [facility]  for your care. Our goal is always to provide you with excellent care. Hearing back from our patients is one way we can continue to improve our services. Please take a few minutes to complete the written survey that you may receive in the mail after your visit with us. Thank you!             Your Updated Medication List - Protect others around you: Learn how to safely use, store and throw away your medicines at www.disposemymeds.org.          This list is accurate as of 6/26/18  1:47 PM.  Always use your most recent med list.                   Brand Name Dispense Instructions for use Diagnosis    aspirin 325 MG tablet      Take 325 mg by mouth daily        atorvastatin 20 MG tablet    LIPITOR     Take 20 mg by mouth daily        fish Oil 1200 MG capsule      Take 1,200 mg by mouth daily        GAS-X PO      Take 125 mg by mouth 3 times daily        metoprolol succinate 25 MG 24 hr tablet    TOPROL-XL     Take 25 mg by mouth daily

## 2018-06-27 ENCOUNTER — ALLIED HEALTH/NURSE VISIT (OUTPATIENT)
Dept: RADIATION ONCOLOGY | Facility: HOSPITAL | Age: 73
End: 2018-06-27

## 2018-06-27 ENCOUNTER — OFFICE VISIT (OUTPATIENT)
Dept: RADIATION ONCOLOGY | Facility: HOSPITAL | Age: 73
End: 2018-06-27

## 2018-06-27 VITALS
BODY MASS INDEX: 31.02 KG/M2 | HEART RATE: 50 BPM | WEIGHT: 204 LBS | DIASTOLIC BLOOD PRESSURE: 78 MMHG | SYSTOLIC BLOOD PRESSURE: 122 MMHG | RESPIRATION RATE: 16 BRPM

## 2018-06-27 DIAGNOSIS — C61 PROSTATE CANCER (H): Primary | ICD-10-CM

## 2018-06-27 PROCEDURE — 77385 ZZH IMRT TREATMENT DELIVERY, SIMPLE: CPT | Performed by: RADIOLOGY

## 2018-06-27 PROCEDURE — 77336 RADIATION PHYSICS CONSULT: CPT | Performed by: RADIOLOGY

## 2018-06-27 ASSESSMENT — PAIN SCALES - GENERAL: PAINLEVEL: NO PAIN (0)

## 2018-06-27 NOTE — PROGRESS NOTES
Service Date: 2018      RADIATION THERAPY PROGRESS NOTE       REFERRING PROVIDERS:  Jaiden Almodovar MD; Daniel Matthews DO; Sabina Flanagan DNP.      DIAGNOSIS:  Prostate carcinoma, Dorys 3+3, with marked acceleration of PSA, currently 9.95 prior to treatment.      RADIATION RX:  The patient has received 6650 cGy to date for treatment of a histologically low-grade prostate carcinoma, but with a marked acceleration in PSA.      SUBJECTIVE:  Doing very well today, for the past couple of weeks he has had fairly marked rectal urgency and symptoms of tenesmus, also some dysuria.  Currently, he has increased his fluid intake and has used some topical management for his proctitis which has helped a great deal.      OBJECTIVE:  Weight 203.7 pounds.  Abdomen benign.  Bowel sounds present.      IMPRESSION:  Routine tolerance to radiation therapy with significant improvement in side effect profile over last week.      PLAN:  Continue treatment as planned.         RANULFO TAPIA MD             D: 2018   T: 2018   MT: CORY      Name:     ROLDAN MARQUES   MRN:      [id number]        Account:      [id number]   :      1945           Service Date: 2018      Document: [id number]       cc: Sabina Matthews DO

## 2018-06-27 NOTE — MR AVS SNAPSHOT
After Visit Summary   6/27/2018    Steven Lopez    MRN: [id number]           Patient Information     Date Of Birth          1945        Visit Information        Provider Department      6/27/2018 1:15 PM [facility]        Today's Diagnoses     Prostate cancer (H)    -  1       Follow-ups after your visit        Your next 10 appointments already scheduled     Jun 28, 2018  1:15 PM CDT   Treatment with [facility]   [facility] ([facility] )    [address] [address] [phone]            Jun 29, 2018  1:15 PM CDT   Treatment with [facility]   [facility] ([facility] )    [address] [address] [phone]            Jul 02, 2018  1:15 PM CDT   Treatment with [facility]   [facility] ([facility] )    [address] [address] [phone]            Jul 03, 2018  1:15 PM CDT   Treatment with [facility]   [facility] ([facility] )    [address] [id number] [phone]            Jul 03, 2018  1:30 PM CDT   on treatment visit with Noel Ballesteros MD   [facility] ([facility] )    [address] [address] [phone]            Jul 05, 2018  1:15 PM CDT   Treatment with [facility]   [facility] ([facility] )    [address] [id number] [phone]            Jul 06, 2018  1:15 PM CDT   Treatment with [facility]   [facility] ([facility] )    [address] [id number] [phone]              Who to contact     If you have questions or need follow up information about today's clinic visit or your schedule please contact [facility] directly at [phone].  Normal or non-critical lab and imaging results will be communicated to you by Ricardo  "letter or phone within 4 business days after the clinic has received the results. If you do not hear from us within 7 days, please contact the clinic through DFine or phone. If you have a critical or abnormal lab result, we will notify you by phone as soon as possible.  Submit refill requests through DFine or call your pharmacy and they will forward the refill request to us. Please allow 3 business days for your refill to be completed.          Additional Information About Your Visit        DFine Information     DFine lets you send messages to your doctor, view your test results, renew your prescriptions, schedule appointments and more. To sign up, go to [email or web address] . Click on \"Log in\" on the left side of the screen, which will take you to the Welcome page. Then click on \"Sign up Now\" on the right side of the page.     You will be asked to enter the access code listed below, as well as some personal information. Please follow the directions to create your username and password.     Your access code is: [email or web address]  Expires: 2018  2:55 PM     Your access code will  in 90 days. If you need help or a new code, please call your [place] clinic or [phone].        Care EveryWhere ID     This is your Care EveryWhere ID. This could be used by other organizations to access your [place] medical records  [id number]         Blood Pressure from Last 3 Encounters:   18 176/80   18 138/82   18 130/84    Weight from Last 3 Encounters:   18 91.6 kg (202 lb)   18 91.2 kg (201 lb)   18 92.1 kg (203 lb)              Today, you had the following     No orders found for display       Primary Care Provider Office Phone # Fax #    Sabina Flanagan [phone]       [place] [address] [address] [facility] [address]        Equal Access to Services     [facility] MEAGHAN AH: Hadii charlie welsho Soandreia, waaxda luqadaha, qaybta kaalmada " steven martineztonymaximiliano bensonEdiaan ah. Angie [facility] [phone].    ATENCIÓN: Si habla saray, tiene a bergman disposición servicios gratuitos de asistencia lingüística. Vidal al [phone].    We comply with applicable federal civil rights laws and [place] laws. We do not discriminate on the basis of race, color, national origin, age, disability, sex, sexual orientation, or gender identity.            Thank you!     Thank you for choosing [facility]  for your care. Our goal is always to provide you with excellent care. Hearing back from our patients is one way we can continue to improve our services. Please take a few minutes to complete the written survey that you may receive in the mail after your visit with us. Thank you!             Your Updated Medication List - Protect others around you: Learn how to safely use, store and throw away your medicines at www.disposemymeds.org.          This list is accurate as of 6/27/18  1:31 PM.  Always use your most recent med list.                   Brand Name Dispense Instructions for use Diagnosis    aspirin 325 MG tablet      Take 325 mg by mouth daily        atorvastatin 20 MG tablet    LIPITOR     Take 20 mg by mouth daily        fish Oil 1200 MG capsule      Take 1,200 mg by mouth daily        GAS-X PO      Take 125 mg by mouth 3 times daily        metoprolol succinate 25 MG 24 hr tablet    TOPROL-XL     Take 25 mg by mouth daily

## 2018-06-27 NOTE — MR AVS SNAPSHOT
After Visit Summary   6/27/2018    Steven Lopez    MRN: [id number]           Patient Information     Date Of Birth          1945        Visit Information        Provider Department      6/27/2018 1:30 PM Noel Ballesteros MD [facility]        Today's Diagnoses     Prostate cancer (H)    -  1       Follow-ups after your visit        Your next 10 appointments already scheduled     Jun 28, 2018  1:15 PM CDT   Treatment with [facility]   [facility] ([facility] )    [address] [id number] [phone]            Jun 29, 2018  1:15 PM CDT   Treatment with [facility]   [facility] ([facility] )    [address] [address] [phone]            Jul 02, 2018  1:15 PM CDT   Treatment with [facility]   [facility] ([facility] )    [address] [id number] [phone]            Jul 03, 2018  1:15 PM CDT   Treatment with [facility]   [facility] ([facility] )    [address] [address] [phone]            Jul 03, 2018  1:30 PM CDT   on treatment visit with Noel Ballesteros MD   [facility] ([facility] )    [address] [address] [phone]            Jul 05, 2018  1:15 PM CDT   Treatment with [facility]   [facility] ([facility] )    [address] [address] [phone]            Jul 06, 2018  1:15 PM CDT   Treatment with [facility]   [facility] ([facility] )    [address] [address] [phone]              Who to contact     If you have questions or need follow up information about today's clinic visit or your schedule please contact [facility] directly at [phone].  Normal or non-critical lab and imaging results will be communicated to you by  "MyChart, letter or phone within 4 business days after the clinic has received the results. If you do not hear from us within 7 days, please contact the clinic through The 360 Mall[employer] or phone. If you have a critical or abnormal lab result, we will notify you by phone as soon as possible.  Submit refill requests through Ingeny or call your pharmacy and they will forward the refill request to us. Please allow 3 business days for your refill to be completed.          Additional Information About Your Visit        The [employer] Information     Ingeny lets you send messages to your doctor, view your test results, renew your prescriptions, schedule appointments and more. To sign up, go to [email or web address] . Click on \"Log in\" on the left side of the screen, which will take you to the Welcome page. Then click on \"Sign up Now\" on the right side of the page.     You will be asked to enter the access code listed below, as well as some personal information. Please follow the directions to create your username and password.     Your access code is: [email or web address]  Expires: 2018  2:55 PM     Your access code will  in 90 days. If you need help or a new code, please call your [place] clinic or [phone].        Care EveryWhere ID     This is your Care EveryWhere ID. This could be used by other organizations to access your [place] medical records  [id number]        Your Vitals Were     Pulse Respirations BMI (Body Mass Index)             50 16 31.02 kg/m2          Blood Pressure from Last 3 Encounters:   18 122/78   18 176/80   18 138/82    Weight from Last 3 Encounters:   18 92.5 kg (204 lb)   18 91.6 kg (202 lb)   18 91.2 kg (201 lb)              Today, you had the following     No orders found for display       Primary Care Provider Office Phone # Fax #    Sabina Flanagan [phone]       [place] [address] [address] [facility] [address]        Equal " Access to Services     [facility]: Hadii aad ku hadhongjuan Felaali, wajasda luqadaha, qaybta kabakaristeven bowen. So [facility] [phone].    ATENCIÓN: Si habla español, tiene a bergman disposición servicios gratuitos de asistencia lingüística. Llame al [phone].    We comply with applicable federal civil rights laws and [place] laws. We do not discriminate on the basis of race, color, national origin, age, disability, sex, sexual orientation, or gender identity.            Thank you!     Thank you for choosing [facility]  for your care. Our goal is always to provide you with excellent care. Hearing back from our patients is one way we can continue to improve our services. Please take a few minutes to complete the written survey that you may receive in the mail after your visit with us. Thank you!             Your Updated Medication List - Protect others around you: Learn how to safely use, store and throw away your medicines at www.disposemymeds.org.          This list is accurate as of 6/27/18  3:32 PM.  Always use your most recent med list.                   Brand Name Dispense Instructions for use Diagnosis    aspirin 325 MG tablet      Take 325 mg by mouth daily        atorvastatin 20 MG tablet    LIPITOR     Take 20 mg by mouth daily        fish Oil 1200 MG capsule      Take 1,200 mg by mouth daily        GAS-X PO      Take 125 mg by mouth 3 times daily        metoprolol succinate 25 MG 24 hr tablet    TOPROL-XL     Take 25 mg by mouth daily

## 2018-06-27 NOTE — PROGRESS NOTES
Steven Lopez received radiation therapy treatment today 06/27/18.    Gaby Do  June 27, 2018  1:27 PM

## 2018-06-28 ENCOUNTER — ALLIED HEALTH/NURSE VISIT (OUTPATIENT)
Dept: RADIATION ONCOLOGY | Facility: HOSPITAL | Age: 73
End: 2018-06-28

## 2018-06-28 DIAGNOSIS — C61 PROSTATE CANCER (H): Primary | ICD-10-CM

## 2018-06-28 PROCEDURE — 77385 ZZH IMRT TREATMENT DELIVERY, SIMPLE: CPT | Performed by: RADIOLOGY

## 2018-06-28 NOTE — MR AVS SNAPSHOT
After Visit Summary   6/28/2018    Steven Lopez    MRN: [id number]           Patient Information     Date Of Birth          1945        Visit Information        Provider Department      6/28/2018 1:15 PM [facility]        Today's Diagnoses     Prostate cancer (H)    -  1       Follow-ups after your visit        Your next 10 appointments already scheduled     Jun 29, 2018  1:15 PM CDT   Treatment with [facility]   [facility] ([facility] )    [address] [address]   [phone]            Jul 02, 2018  1:15 PM CDT   Treatment with [facility]   [facility] ([facility] )    [address] [address]   [phone]            Jul 03, 2018  1:15 PM CDT   Treatment with [facility]   [facility] ([facility] )    [address] [address]   [phone]            Jul 03, 2018  1:30 PM CDT   on treatment visit with Noel Ballesteros MD   [facility] ([facility] )    [address] [address] [phone]            Jul 05, 2018  1:15 PM CDT   Treatment with [facility]   [facility] ([facility] )    [address] [address] [phone]            Jul 06, 2018  1:15 PM CDT   Treatment with [facility]   [facility] ([facility] )    [address] [address] [phone]              Who to contact     If you have questions or need follow up information about today's clinic visit or your schedule please contact [facility] directly at [phone].  Normal or non-critical lab and imaging results will be communicated to you by MyChart, letter or phone within 4 business days after the clinic has received the results. If you do not hear from us within 7 days, please contact the clinic through MyChart or phone. If you  "have a critical or abnormal lab result, we will notify you by phone as soon as possible.  Submit refill requests through pyco or call your pharmacy and they will forward the refill request to us. Please allow 3 business days for your refill to be completed.          Additional Information About Your Visit        [employer] Information     pyco lets you send messages to your doctor, view your test results, renew your prescriptions, schedule appointments and more. To sign up, go to [email or web address] . Click on \"Log in\" on the left side of the screen, which will take you to the Welcome page. Then click on \"Sign up Now\" on the right side of the page.     You will be asked to enter the access code listed below, as well as some personal information. Please follow the directions to create your username and password.     Your access code is: [email or web address]  Expires: 2018  2:55 PM     Your access code will  in 90 days. If you need help or a new code, please call your [place] clinic or [phone].        Care EveryWhere ID     This is your Care EveryWhere ID. This could be used by other organizations to access your [place] medical records  [id number]         Blood Pressure from Last 3 Encounters:   18 122/78   18 176/80   18 138/82    Weight from Last 3 Encounters:   18 92.5 kg (204 lb)   18 91.6 kg (202 lb)   18 91.2 kg (201 lb)              Today, you had the following     No orders found for display       Primary Care Provider Office Phone # Fax #    Sabina Flanagan [phone]       [place] [address] [address]  [facility] [address]        Equal Access to Services     IESHA HARDING : Wilda Manzano, fernando stark, beau kaalmaabel martinez, steven escobedo So [facility] [phone].    ATENCIÓN: Si habla español, tiene a bergman disposición servicios gratuitos de asistencia lingüística. Llame al " [phone].    We comply with applicable federal civil rights laws and [place] laws. We do not discriminate on the basis of race, color, national origin, age, disability, sex, sexual orientation, or gender identity.            Thank you!     Thank you for choosing [facility]  for your care. Our goal is always to provide you with excellent care. Hearing back from our patients is one way we can continue to improve our services. Please take a few minutes to complete the written survey that you may receive in the mail after your visit with us. Thank you!             Your Updated Medication List - Protect others around you: Learn how to safely use, store and throw away your medicines at www.disposemymeds.org.          This list is accurate as of 6/28/18  1:29 PM.  Always use your most recent med list.                   Brand Name Dispense Instructions for use Diagnosis    aspirin 325 MG tablet      Take 325 mg by mouth daily        atorvastatin 20 MG tablet    LIPITOR     Take 20 mg by mouth daily        fish Oil 1200 MG capsule      Take 1,200 mg by mouth daily        GAS-X PO      Take 125 mg by mouth 3 times daily        metoprolol succinate 25 MG 24 hr tablet    TOPROL-XL     Take 25 mg by mouth daily

## 2018-06-28 NOTE — PROGRESS NOTES
Steven Lopez received radiation therapy treatment today 06/28/18.    Satnam Moise  June 28, 2018  1:29 PM

## 2018-06-29 ENCOUNTER — ALLIED HEALTH/NURSE VISIT (OUTPATIENT)
Dept: RADIATION ONCOLOGY | Facility: HOSPITAL | Age: 73
End: 2018-06-29

## 2018-06-29 DIAGNOSIS — C61 PROSTATE CANCER (H): Primary | ICD-10-CM

## 2018-06-29 PROCEDURE — 77385 ZZH IMRT TREATMENT DELIVERY, SIMPLE: CPT | Performed by: RADIOLOGY

## 2018-06-29 NOTE — MR AVS SNAPSHOT
After Visit Summary   6/29/2018    Steven Lopez    MRN: [id number]           Patient Information     Date Of Birth          1945        Visit Information        Provider Department      6/29/2018 1:15 PM [facility]        Today's Diagnoses     Prostate cancer (H)    -  1       Follow-ups after your visit        Your next 10 appointments already scheduled     Jul 02, 2018  1:15 PM CDT   Treatment with [facility]   [facility] ([facility] )    [address] [address] [phone]            Jul 03, 2018  1:15 PM CDT   Treatment with [facility]   [facility] ([facility] )    [address] [address] [phone]            Jul 03, 2018  1:30 PM CDT   on treatment visit with Noel Ballesteros MD   [facility] ([facility] )    [address] [address] [phone]            Jul 05, 2018  1:15 PM CDT   Treatment with [facility]   [facility] ([facility] )    [address] [address] [phone]            Jul 06, 2018  1:15 PM CDT   Treatment with [facility]   [facility] ([facility] )    [address] [address] [phone]              Who to contact     If you have questions or need follow up information about today's clinic visit or your schedule please contact [facility] directly at [phone].  Normal or non-critical lab and imaging results will be communicated to you by MyChart, letter or phone within 4 business days after the clinic has received the results. If you do not hear from us within 7 days, please contact the clinic through Grub[employer] or phone. If you have a critical or abnormal lab result, we will notify you by phone as soon as possible.  Submit refill requests through VisiKard or call your pharmacy and they will forward the  "refill request to us. Please allow 3 business days for your refill to be completed.          Additional Information About Your Visit        MyChart Information     RecoVend lets you send messages to your doctor, view your test results, renew your prescriptions, schedule appointments and more. To sign up, go to [email or web address] . Click on \"Log in\" on the left side of the screen, which will take you to the Welcome page. Then click on \"Sign up Now\" on the right side of the page.     You will be asked to enter the access code listed below, as well as some personal information. Please follow the directions to create your username and password.     Your access code is: [email or web address]  Expires: 2018  2:55 PM     Your access code will  in 90 days. If you need help or a new code, please call your [place] clinic or [phone].        Care EveryWhere ID     This is your Care EveryWhere ID. This could be used by other organizations to access your [place] medical records  [id number]         Blood Pressure from Last 3 Encounters:   18 122/78   18 176/80   18 138/82    Weight from Last 3 Encounters:   18 92.5 kg (204 lb)   18 91.6 kg (202 lb)   18 91.2 kg (201 lb)              Today, you had the following     No orders found for display       Primary Care Provider Office Phone # Fax #    Sabina KENDAL Flanagan [phone]       [place] [address] [address] [facility] [address]        Equal Access to Services     [facility]: Hadii aad ku hadashjuan Soandreia, waaxda luqadaha, qaybta kaalmada michelle, steven victoria . So [facility] [phone].    ATENCIÓN: Si habla español, tiene a bergman disposición servicios gratuitos de asistencia lingüística. Llame al [phone].    We comply with applicable federal civil rights laws and [place] laws. We do not discriminate on the basis of race, color, national origin, age, disability, sex, " sexual orientation, or gender identity.            Thank you!     Thank you for choosing [facility]  for your care. Our goal is always to provide you with excellent care. Hearing back from our patients is one way we can continue to improve our services. Please take a few minutes to complete the written survey that you may receive in the mail after your visit with us. Thank you!             Your Updated Medication List - Protect others around you: Learn how to safely use, store and throw away your medicines at www.disposemymeds.org.          This list is accurate as of 6/29/18  1:29 PM.  Always use your most recent med list.                   Brand Name Dispense Instructions for use Diagnosis    aspirin 325 MG tablet      Take 325 mg by mouth daily        atorvastatin 20 MG tablet    LIPITOR     Take 20 mg by mouth daily        fish Oil 1200 MG capsule      Take 1,200 mg by mouth daily        GAS-X PO      Take 125 mg by mouth 3 times daily        metoprolol succinate 25 MG 24 hr tablet    TOPROL-XL     Take 25 mg by mouth daily

## 2018-06-29 NOTE — PROCEDURES
Steven Lopez received radiation therapy treatment today 06/29/18.    Renetta Hughes  June 29, 2018  1:15 PM

## 2018-07-02 ENCOUNTER — ALLIED HEALTH/NURSE VISIT (OUTPATIENT)
Dept: RADIATION ONCOLOGY | Facility: HOSPITAL | Age: 73
End: 2018-07-02
Attending: RADIOLOGY
Payer: MEDICARE

## 2018-07-02 DIAGNOSIS — C61 PROSTATE CANCER (H): Primary | ICD-10-CM

## 2018-07-02 PROCEDURE — 77385 ZZH IMRT TREATMENT DELIVERY, SIMPLE: CPT | Performed by: RADIOLOGY

## 2018-07-02 NOTE — MR AVS SNAPSHOT
After Visit Summary   7/2/2018    Steven Lopez    MRN: [id number]           Patient Information     Date Of Birth          1945        Visit Information        Provider Department      7/2/2018 1:15 PM [facility]        Today's Diagnoses     Prostate cancer (H)    -  1       Follow-ups after your visit        Your next 10 appointments already scheduled     Jul 03, 2018  1:15 PM CDT   Treatment with [facility]   [facility] ([facility] )    [address] [id number] [phone]            Jul 03, 2018  1:30 PM CDT   on treatment visit with Noel Ballesteros MD   [facility] ([facility] )    [address] [id number] [phone]            Jul 05, 2018  1:15 PM CDT   Treatment with [facility]   [facility] ([facility] )    [address] [id number] [phone]            Jul 06, 2018  1:15 PM CDT   Treatment with [facility]   [facility] ([facility] )    [address] [id number] [phone]              Who to contact     If you have questions or need follow up information about today's clinic visit or your schedule please contact [facility] directly at [phone].  Normal or non-critical lab and imaging results will be communicated to you by MyChart, letter or phone within 4 business days after the clinic has received the results. If you do not hear from us within 7 days, please contact the clinic through MyChart or phone. If you have a critical or abnormal lab result, we will notify you by phone as soon as possible.  Submit refill requests through "[employer]" or call your pharmacy and they will forward the refill request to us. Please allow 3 business days for your refill to be completed.          Additional Information About Your Visit        MyChart Information     "[employer] lets you  "send messages to your doctor, view your test results, renew your prescriptions, schedule appointments and more. To sign up, go to [email or web address] . Click on \"Log in\" on the left side of the screen, which will take you to the Welcome page. Then click on \"Sign up Now\" on the right side of the page.     You will be asked to enter the access code listed below, as well as some personal information. Please follow the directions to create your username and password.     Your access code is: [email or web address]  Expires: 2018  2:55 PM     Your access code will  in 90 days. If you need help or a new code, please call your [place] clinic or [phone].        Care EveryWhere ID     This is your Care EveryWhere ID. This could be used by other organizations to access your [place] medical records  [id number]         Blood Pressure from Last 3 Encounters:   18 122/78   18 176/80   18 138/82    Weight from Last 3 Encounters:   18 92.5 kg (204 lb)   18 91.6 kg (202 lb)   18 91.2 kg (201 lb)              Today, you had the following     No orders found for display       Primary Care Provider Office Phone # Fax #    Sabina KENDAL Flanagan [phone]       [address] [address]        Equal Access to Services     [facility]: Hadii aad ku hadasho Socarlali, waaxda luqadaha, qaybta kaalmada adeegyada, steven lei. So [facility] [phone].    ATENCIÓN: Si habla saray, tiene a bergman disposición servicios gratuitos de asistencia lingüística. Vidal al [phone].    We comply with applicable federal civil rights laws and [place] laws. We do not discriminate on the basis of race, color, national origin, age, disability, sex, sexual orientation, or gender identity.            Thank you!     Thank you for choosing [facility]  for your care. Our goal is always to provide you with excellent care. " Hearing back from our patients is one way we can continue to improve our services. Please take a few minutes to complete the written survey that you may receive in the mail after your visit with us. Thank you!             Your Updated Medication List - Protect others around you: Learn how to safely use, store and throw away your medicines at www.disposemymeds.org.          This list is accurate as of 7/2/18  1:32 PM.  Always use your most recent med list.                   Brand Name Dispense Instructions for use Diagnosis    aspirin 325 MG tablet      Take 325 mg by mouth daily        atorvastatin 20 MG tablet    LIPITOR     Take 20 mg by mouth daily        fish Oil 1200 MG capsule      Take 1,200 mg by mouth daily        GAS-X PO      Take 125 mg by mouth 3 times daily        metoprolol succinate 25 MG 24 hr tablet    TOPROL-XL     Take 25 mg by mouth daily

## 2018-07-02 NOTE — PROGRESS NOTES
Steven Lopez received radiation therapy treatment today 07/02/18.    Satnam Moise  July 2, 2018  1:18 PM

## 2018-07-03 ENCOUNTER — OFFICE VISIT (OUTPATIENT)
Dept: RADIATION ONCOLOGY | Facility: HOSPITAL | Age: 73
End: 2018-07-03
Attending: RADIOLOGY
Payer: MEDICARE

## 2018-07-03 ENCOUNTER — ALLIED HEALTH/NURSE VISIT (OUTPATIENT)
Dept: RADIATION ONCOLOGY | Facility: HOSPITAL | Age: 73
End: 2018-07-03

## 2018-07-03 VITALS
BODY MASS INDEX: 30.87 KG/M2 | SYSTOLIC BLOOD PRESSURE: 160 MMHG | HEART RATE: 52 BPM | WEIGHT: 203 LBS | DIASTOLIC BLOOD PRESSURE: 88 MMHG | RESPIRATION RATE: 16 BRPM

## 2018-07-03 DIAGNOSIS — C61 PROSTATE CANCER (H): Primary | ICD-10-CM

## 2018-07-03 PROCEDURE — 77385 ZZH IMRT TREATMENT DELIVERY, SIMPLE: CPT | Performed by: RADIOLOGY

## 2018-07-03 ASSESSMENT — PAIN SCALES - GENERAL: PAINLEVEL: NO PAIN (0)

## 2018-07-03 NOTE — MR AVS SNAPSHOT
"              After Visit Summary   7/3/2018    Steven Lopez    MRN: [id number]           Patient Information     Date Of Birth          1945        Visit Information        Provider Department      7/3/2018 1:30 PM Noel Ballesteros MD [facility]        Today's Diagnoses     Prostate cancer (H)    -  1       Follow-ups after your visit        Your next 10 appointments already scheduled     Jul 05, 2018  1:15 PM CDT   Treatment with [facility]   [facility] Rad[facility]cology ([facility] )    [address] [address] [phone]            Jul 06, 2018  1:15 PM CDT   Treatment with [facility]   [facility] ([facility] )    [address] [address] [phone]            Oct 01, 2018  1:15 PM CDT   Follow Up with Noel Ballesteros MD   [facility] ([facility] )    [address] [address] [phone]              Who to contact     If you have questions or need follow up information about today's clinic visit or your schedule please contact [facility] directly at [phone].  Normal or non-critical lab and imaging results will be communicated to you by [email or web address], letter or phone within 4 business days after the clinic has received the results. If you do not hear from us within 7 days, please contact the clinic through [email or web address] or phone. If you have a critical or abnormal lab result, we will notify you by phone as soon as possible.  Submit refill requests through [employer] or call your pharmacy and they will forward the refill request to us. Please allow 3 business days for your refill to be completed.          Additional Information About Your Visit        MyChart Information     [employer] lets you send messages to your doctor, view your test results, renew your prescriptions, schedule appointments and more. To sign up, go to [email or web address] . Click on \"Log in\" on the " "left side of the screen, which will take you to the Welcome page. Then click on \"Sign up Now\" on the right side of the page.     You will be asked to enter the access code listed below, as well as some personal information. Please follow the directions to create your username and password.     Your access code is: [email or web address]  Expires: 2018  2:55 PM     Your access code will  in 90 days. If you need help or a new code, please call your [facility] or [phone].        Care EveryWhere ID     This is your Care EveryWhere ID. This could be used by other organizations to access your [place] medical records  [id number]        Your Vitals Were     Pulse Respirations BMI (Body Mass Index)             52 16 30.87 kg/m2          Blood Pressure from Last 3 Encounters:   18 160/88   18 122/78   18 176/80    Weight from Last 3 Encounters:   18 92.1 kg (203 lb)   18 92.5 kg (204 lb)   18 91.6 kg (202 lb)              Today, you had the following     No orders found for display       Primary Care Provider Office Phone # Fax #    Sabina Flanagan [phone]       [place] [address] [address] [facility] [address]        Equal Access to Services     [facility] MEAGHAN : Hadii charlie ku hadasho Socarlali, waaxda luqadaha, qaybta kaalmada adeegyada, steven lei. So [facility] [phone].    ATENCIÓN: Si habla español, tiene a bergman disposición servicios gratuitos de asistencia lingüística. Vidal al [phone].    We comply with applicable federal civil rights laws and [place] laws. We do not discriminate on the basis of race, color, national origin, age, disability, sex, sexual orientation, or gender identity.            Thank you!     Thank you for choosing [facility]  for your care. Our goal is always to provide you with excellent care. Hearing back from our patients is one way we can continue to improve our services. " Please take a few minutes to complete the written survey that you may receive in the mail after your visit with us. Thank you!             Your Updated Medication List - Protect others around you: Learn how to safely use, store and throw away your medicines at www.disposemymeds.org.          This list is accurate as of 7/3/18  3:05 PM.  Always use your most recent med list.                   Brand Name Dispense Instructions for use Diagnosis    aspirin 325 MG tablet      Take 325 mg by mouth daily        atorvastatin 20 MG tablet    LIPITOR     Take 20 mg by mouth daily        fish Oil 1200 MG capsule      Take 1,200 mg by mouth daily        GAS-X PO      Take 125 mg by mouth 3 times daily        metoprolol succinate 25 MG 24 hr tablet    TOPROL-XL     Take 25 mg by mouth daily

## 2018-07-03 NOTE — MR AVS SNAPSHOT
"              After Visit Summary   7/3/2018    Steven Lopez    MRN: [id number]           Patient Information     Date Of Birth          1945        Visit Information        Provider Department      7/3/2018 1:15 PM [facility]        Today's Diagnoses     Prostate cancer (H)    -  1       Follow-ups after your visit        Your next 10 appointments already scheduled     Jul 03, 2018  1:30 PM CDT   on treatment visit with Noel Ballesteros MD   [facility] ([facility] )    [address] [address] [phone]            Jul 05, 2018  1:15 PM CDT   Treatment with [facility]   [facility] ([facility] )    [address] [address] [phone]            Jul 06, 2018  1:15 PM CDT   Treatment with [facility]   [facility] ([facility] )    [address] [address] [phone]              Who to contact     If you have questions or need follow up information about today's clinic visit or your schedule please contact [facility] directly at [phone].  Normal or non-critical lab and imaging results will be communicated to you by [employer], letter or phone within 4 business days after the clinic has received the results. If you do not hear from us within 7 days, please contact the clinic through [employer] or phone. If you have a critical or abnormal lab result, we will notify you by phone as soon as possible.  Submit refill requests through [employer] or call your pharmacy and they will forward the refill request to us. Please allow 3 business days for your refill to be completed.          Additional Information About Your Visit        MyChart Information     [employer] lets you send messages to your doctor, view your test results, renew your prescriptions, schedule appointments and more. To sign up, go to [email or web address] . Click on \"Log in\" on the " "left side of the screen, which will take you to the Welcome page. Then click on \"Sign up Now\" on the right side of the page.     You will be asked to enter the access code listed below, as well as some personal information. Please follow the directions to create your username and password.     Your access code is: [email or web address]  Expires: 2018  2:55 PM     Your access code will  in 90 days. If you need help or a new code, please call your [place] clinic or [phone].        Care EveryWhere ID     This is your Care EveryWhere ID. This could be used by other organizations to access your [place] medical records  [id number]         Blood Pressure from Last 3 Encounters:   18 122/78   18 176/80   18 138/82    Weight from Last 3 Encounters:   18 92.5 kg (204 lb)   18 91.6 kg (202 lb)   18 91.2 kg (201 lb)              Today, you had the following     No orders found for display       Primary Care Provider Office Phone # Fax #    Sabina Panchito, [phone]       [address] [address]        Equal Access to Services     IESHA HARDING : Hadii charlie ku hadasho Soomaali, waaxda luqadaha, qaybta kaalmada adeegyada, waxay hai haycarmencita lei. So [facility] [phone].    ATENCIÓN: Si habla español, tiene a bergman disposición servicios gratuitos de asistencia lingüística. Llame al [phone].    We comply with applicable federal civil rights laws and [place] laws. We do not discriminate on the basis of race, color, national origin, age, disability, sex, sexual orientation, or gender identity.            Thank you!     Thank you for choosing [facility]  for your care. Our goal is always to provide you with excellent care. Hearing back from our patients is one way we can continue to improve our services. Please take a few minutes to complete the written survey that you may receive in the mail after your " visit with us. Thank you!             Your Updated Medication List - Protect others around you: Learn how to safely use, store and throw away your medicines at www.disposemymeds.org.          This list is accurate as of 7/3/18  1:20 PM.  Always use your most recent med list.                   Brand Name Dispense Instructions for use Diagnosis    aspirin 325 MG tablet      Take 325 mg by mouth daily        atorvastatin 20 MG tablet    LIPITOR     Take 20 mg by mouth daily        fish Oil 1200 MG capsule      Take 1,200 mg by mouth daily        GAS-X PO      Take 125 mg by mouth 3 times daily        metoprolol succinate 25 MG 24 hr tablet    TOPROL-XL     Take 25 mg by mouth daily

## 2018-07-03 NOTE — PROGRESS NOTES
Steven Lopez received radiation therapy treatment today 07/03/18.    Ever Lr  July 3, 2018  1:17 PM

## 2018-07-03 NOTE — PROGRESS NOTES
Service Date: 2018      RADIATION ONCOLOGY PROGRESS NOTE       REFERRING PROVIDERS:  Jaiden Almodovar MD; Daniel Matthews DO; Sabina Flanagan DNP      DIAGNOSIS:  Prostate carcinoma, Dorys 3+3 with marked acceleration, PSA currently 9.95 prior to treatment.      RADIATION RX:  The patient has received 7410 cGy for potentially definitive management of the above-described prostate carcinoma.      SUBJECTIVE:  Doing extremely well.  He has had a bit of tenesmus or anal irritation, currently well controlled with topical management, no significant diarrhea or change in nocturia, frequency.      OBJECTIVE:  Weight 203 pounds.  Abdomen benign.  Bowel sounds present.      IMPRESSION:  Routine tolerance to radiation therapy with significant improvement in side effect profile.      PLAN:  The patient will be finished with treatment on Friday.  We will plan 3-month followup.         RANULFO TAPIA MD             D: 2018   T: 2018   MT: CORY      Name:     ROLDAN MARQUES   MRN:      [id number]        Account:      [id number]   :      1945           Service Date: 2018      Document: [id number]       cc: Sabina Matthews DO

## 2018-07-04 ASSESSMENT — PATIENT HEALTH QUESTIONNAIRE - PHQ9: SUM OF ALL RESPONSES TO PHQ QUESTIONS 1-9: 0

## 2018-07-05 ENCOUNTER — ALLIED HEALTH/NURSE VISIT (OUTPATIENT)
Dept: RADIATION ONCOLOGY | Facility: HOSPITAL | Age: 73
End: 2018-07-05

## 2018-07-05 DIAGNOSIS — C61 PROSTATE CANCER (H): Primary | ICD-10-CM

## 2018-07-05 PROCEDURE — 77336 RADIATION PHYSICS CONSULT: CPT | Performed by: RADIOLOGY

## 2018-07-05 PROCEDURE — 77385 ZZH IMRT TREATMENT DELIVERY, SIMPLE: CPT | Performed by: RADIOLOGY

## 2018-07-05 NOTE — MR AVS SNAPSHOT
"              After Visit Summary   7/5/2018    Steven Lopez    MRN: [id number]           Patient Information     Date Of Birth          1945        Visit Information        Provider Department      7/5/2018 1:15 PM [facility]        Today's Diagnoses     Prostate cancer (H)    -  1       Follow-ups after your visit        Your next 10 appointments already scheduled     Jul 06, 2018  1:15 PM CDT   Treatment with [facility]   [facility] ([facility] )    [address] [address] [phone]            Oct 01, 2018  1:15 PM CDT   Follow Up with Noel Ballesteros MD   [facility] ([facility] )    [address] [address] [phone]              Who to contact     If you have questions or need follow up information about today's clinic visit or your schedule please contact [facility] directly at [phone].  Normal or non-critical lab and imaging results will be communicated to you by [employer], letter or phone within 4 business days after the clinic has received the results. If you do not hear from us within 7 days, please contact the clinic through [employer] or phone. If you have a critical or abnormal lab result, we will notify you by phone as soon as possible.  Submit refill requests through [email or web address] or call your pharmacy and they will forward the refill request to us. Please allow 3 business days for your refill to be completed.          Additional Information About Your Visit        [employer]Petsy Information     [email or web address] lets you send messages to your doctor, view your test results, renew your prescriptions, schedule appointments and more. To sign up, go to [email or web address] . Click on \"Log in\" on the left side of the screen, which will take you to the Welcome page. Then click on \"Sign up Now\" on the right side of the page.     You will be asked to enter the access code listed below, " as well as some personal information. Please follow the directions to create your username and password.     Your access code is: [email or web address]  Expires: 2018  2:55 PM     Your access code will  in 90 days. If you need help or a new code, please call your [place] clinic or [phone].        Care EveryWhere ID     This is your Care EveryWhere ID. This could be used by other organizations to access your [place] medical records  [id number]         Blood Pressure from Last 3 Encounters:   18 160/88   18 122/78   18 176/80    Weight from Last 3 Encounters:   18 92.1 kg (203 lb)   18 92.5 kg (204 lb)   18 91.6 kg (202 lb)              Today, you had the following     No orders found for display       Primary Care Provider Office Phone # Fax #    Sabina KENDAL Flanagan [phone]       [place] [address] [address] [facility] [address]        Equal Access to Services     [facility]: Hadii aad ku hadasho Soandreia, waaxda luqadaha, qaybta kaalmada adetrevor, steven victoria . So [facility] [phone].    ATENCIÓN: Si habla español, tiene a bergman disposición servicios gratuitos de asistencia lingüística. Audreyame al [phone].    We comply with applicable federal civil rights laws and [place] laws. We do not discriminate on the basis of race, color, national origin, age, disability, sex, sexual orientation, or gender identity.            Thank you!     Thank you for choosing [facility]  for your care. Our goal is always to provide you with excellent care. Hearing back from our patients is one way we can continue to improve our services. Please take a few minutes to complete the written survey that you may receive in the mail after your visit with us. Thank you!             Your Updated Medication List - Protect others around you: Learn how to safely use, store and throw away your medicines at www.disposemymeds.org.           This list is accurate as of 7/5/18  1:21 PM.  Always use your most recent med list.                   Brand Name Dispense Instructions for use Diagnosis    aspirin 325 MG tablet      Take 325 mg by mouth daily        atorvastatin 20 MG tablet    LIPITOR     Take 20 mg by mouth daily        fish Oil 1200 MG capsule      Take 1,200 mg by mouth daily        GAS-X PO      Take 125 mg by mouth 3 times daily        metoprolol succinate 25 MG 24 hr tablet    TOPROL-XL     Take 25 mg by mouth daily

## 2018-07-05 NOTE — PROGRESS NOTES
Steven Lopez received radiation therapy treatment today 07/05/18.    Trey Leyva  July 5, 2018  1:18 PM

## 2018-07-06 ENCOUNTER — ALLIED HEALTH/NURSE VISIT (OUTPATIENT)
Dept: RADIATION ONCOLOGY | Facility: HOSPITAL | Age: 73
End: 2018-07-06

## 2018-07-06 DIAGNOSIS — C61 PROSTATE CANCER (H): Primary | ICD-10-CM

## 2018-07-06 PROCEDURE — 77385 ZZH IMRT TREATMENT DELIVERY, SIMPLE: CPT | Performed by: RADIOLOGY

## 2018-07-06 NOTE — PROGRESS NOTES
Steevn Lopez received radiation therapy treatment today 07/06/18.    Satnam Moise  July 6, 2018  1:15 PM

## 2018-07-06 NOTE — MR AVS SNAPSHOT
"              After Visit Summary   7/6/2018    Steven Lopez    MRN: [id number]           Patient Information     Date Of Birth          1945        Visit Information        Provider Department      7/6/2018 1:15 PM [facility]        Today's Diagnoses     Prostate cancer (H)    -  1       Follow-ups after your visit        Your next 10 appointments already scheduled     Oct 01, 2018  1:15 PM CDT   Follow Up with Noel Ballesteros MD   [facility] ([facility] )    [address] [place] [id number] [phone]              Who to contact     If you have questions or need follow up information about today's clinic visit or your schedule please contact [facility] directly at [phone].  Normal or non-critical lab and imaging results will be communicated to you by [employer], letter or phone within 4 business days after the clinic has received the results. If you do not hear from us within 7 days, please contact the clinic through [employer] or phone. If you have a critical or abnormal lab result, we will notify you by phone as soon as possible.  Submit refill requests through [employer] or call your pharmacy and they will forward the refill request to us. Please allow 3 business days for your refill to be completed.          Additional Information About Your Visit        PawClinicharAteo Information     [employer] lets you send messages to your doctor, view your test results, renew your prescriptions, schedule appointments and more. To sign up, go to [email or web address] . Click on \"Log in\" on the left side of the screen, which will take you to the Welcome page. Then click on \"Sign up Now\" on the right side of the page.     You will be asked to enter the access code listed below, as well as some personal information. Please follow the directions to create your username and password.     Your access code is: [email or web address]  Expires: 7/19/2018  2:55 PM     Your " access code will  in 90 days. If you need help or a new code, please call your [place] clinic or [phone].        Care EveryWhere ID     This is your Care EveryWhere ID. This could be used by other organizations to access your [place] medical records  [id number]         Blood Pressure from Last 3 Encounters:   18 160/88   18 122/78   18 176/80    Weight from Last 3 Encounters:   18 92.1 kg (203 lb)   18 92.5 kg (204 lb)   18 91.6 kg (202 lb)              Today, you had the following     No orders found for display       Primary Care Provider Office Phone # Fax #    Sabina KENDAL Flanagan [phone]       [place] [address] [address] [facility] [address]        Equal Access to Services     [facility]: Hadii charlie carolina hadasho Soandreia, waaxda luqadaha, qaybta kaalmada adeegyada, steven victoria . So [facility] [phone].    ATENCIÓN: Si habla español, tiene a bergman disposición servicios gratuitos de asistencia lingüística. Llame al [phone].    We comply with applicable federal civil rights laws and [place] laws. We do not discriminate on the basis of race, color, national origin, age, disability, sex, sexual orientation, or gender identity.            Thank you!     Thank you for choosing [facility]  for your care. Our goal is always to provide you with excellent care. Hearing back from our patients is one way we can continue to improve our services. Please take a few minutes to complete the written survey that you may receive in the mail after your visit with us. Thank you!             Your Updated Medication List - Protect others around you: Learn how to safely use, store and throw away your medicines at www.disposemymeds.org.          This list is accurate as of 18  1:25 PM.  Always use your most recent med list.                   Brand Name Dispense Instructions for use Diagnosis    aspirin 325 MG  tablet      Take 325 mg by mouth daily        atorvastatin 20 MG tablet    LIPITOR     Take 20 mg by mouth daily        fish Oil 1200 MG capsule      Take 1,200 mg by mouth daily        GAS-X PO      Take 125 mg by mouth 3 times daily        metoprolol succinate 25 MG 24 hr tablet    TOPROL-XL     Take 25 mg by mouth daily

## 2018-07-09 NOTE — PROGRESS NOTES
Service Date: 2018      RADIATION THERAPY TREATMENT SUMMARY       REFERRING PROVIDERS:  Jaiden Almodovar MD, Daniel Matthews DO,  Sabina Flanagan DNP      DIAGNOSIS:  Prostate carcinoma, [place]ory[place] 3+3 with marked acceleration and PSA currently 9.95 prior to treatment.      TREATMENT INTENT:  Local control and cure.      REGION TREATED:  Prostate and seminal vesicles.      PRIMARY TREATMENT TECHNIQUE:  IMRT.      ENERGY:  6 MV.      TUMOR DOSE:  7790 cGy.      NUMBER OF TREATMENTS:  41 fractions.      ELAPSED CALENDAR DAYS:  57 elapsed days.      COMPLETION DATE:  2018.         COMMENTS:  Mr. Marques was treated for fairly low-grade prostate carcinoma, although with significant PSA acceleration.  He tolerated his treatment quite well.  He did quite well.  He did have a bit of tenesmus and/or anal irritation which was well controlled with topical management.      IMPRESSION:  Excellent tolerance to radiation therapy.      PLAN:  We will see him back for routine followup in 3 months.         RANULFO TAPIA MD             D: 2018   T: 2018   MT: CORY      Name:     ROLDAN MARQUES   MRN:      [id number]        Account:      [id number]   :      1945           Service Date: 2018      Document: [id number]       cc: Sabina Matthews DO

## 2018-10-19 DIAGNOSIS — Z12.5 SPECIAL SCREENING FOR MALIGNANT NEOPLASM OF PROSTATE: Primary | ICD-10-CM

## 2018-10-22 ENCOUNTER — ONCOLOGY VISIT (OUTPATIENT)
Dept: RADIATION ONCOLOGY | Facility: HOSPITAL | Age: 73
End: 2018-10-22
Attending: RADIOLOGY
Payer: MEDICARE

## 2018-10-22 VITALS
RESPIRATION RATE: 16 BRPM | SYSTOLIC BLOOD PRESSURE: 162 MMHG | BODY MASS INDEX: 31.63 KG/M2 | DIASTOLIC BLOOD PRESSURE: 94 MMHG | WEIGHT: 208 LBS | HEART RATE: 56 BPM

## 2018-10-22 DIAGNOSIS — Z12.5 SPECIAL SCREENING FOR MALIGNANT NEOPLASM OF PROSTATE: ICD-10-CM

## 2018-10-22 DIAGNOSIS — C61 PROSTATE CANCER (H): Primary | ICD-10-CM

## 2018-10-22 LAB — PSA SERPL-ACNC: 1.81 UG/L (ref 0–4)

## 2018-10-22 PROCEDURE — G0463 HOSPITAL OUTPT CLINIC VISIT: HCPCS | Performed by: RADIOLOGY

## 2018-10-22 PROCEDURE — 36415 COLL VENOUS BLD VENIPUNCTURE: CPT | Performed by: RADIOLOGY

## 2018-10-22 PROCEDURE — G0103 PSA SCREENING: HCPCS | Performed by: RADIOLOGY

## 2018-10-22 PROCEDURE — 84153 ASSAY OF PSA TOTAL: CPT | Performed by: RADIOLOGY

## 2018-10-22 RX ORDER — HYDROCORTISONE ACETATE 0.5 %
CREAM (GRAM) TOPICAL
COMMUNITY
End: 2019-11-07

## 2018-10-22 RX ORDER — SILDENAFIL 100 MG/1
TABLET, FILM COATED ORAL
COMMUNITY
Start: 2018-09-26

## 2018-10-22 ASSESSMENT — PAIN SCALES - GENERAL: PAINLEVEL: NO PAIN (0)

## 2018-10-22 NOTE — PROGRESS NOTES
Service Date: 10/22/2018      RADIATION THERAPY FOLLOW UP      REFERRING PROVIDERS:  Jaiden Almodovar MD; Daniel Matthews DO; Sabina Flanagan DNP.      DIAGNOSIS:  Prostate carcinoma, Dorys 3+3 with marked acceleration of PSA 9.95 prior to treatment.      RADIATION RX:  The patient completed radiation therapy, 7790 cGy on 2018.        SUBJECTIVE:  He was having some dysuria as well as some proctitis and tenesmus during his treatment course.  He states that this has essentially resolved.  Currently, no significant dysuria, diarrhea, rare nocturia.      OBJECTIVE:   VITAL SIGNS:  Weight 208 pounds.  Blood pressure 162/94, heart rate 56.   HEENT:  Extraocular muscle movements, full.  Pupils:  Equal, round, reactive.  Face is symmetric.  Palate and tongue, midline and symmetric.   NECK:  No cervical or supraclavicular lymphadenopathy.   LUNGS:  Clear to auscultation.   HEART:  Cardiac exam reveals regular rate and rhythm without murmurs or extra sounds.   ABDOMEN:  Nontender, without appreciable organomegaly.  No inguinal lymphadenopathy.   EXTREMITIES:  Extremity strength is intact.  Deep tendon reflexes are symmetric.      LABORATORY DATA:  Today's PSA returned at 1.81.       IMPRESSION:  Excellent clinical recovery following definitive management for intermediate risk prostate carcinoma.      PLAN:  Continue six-month followup.         RANULFO TAPIA MD             D: 10/22/2018   T: 10/22/2018   MT: CC      Name:     ROLDAN MARQUES   MRN:      [id number]        Account:      [id number]   :      1945           Service Date: 10/22/2018      Document: [id number]       cc: Sabina Matthews DO

## 2018-10-22 NOTE — MR AVS SNAPSHOT
"              After Visit Summary   10/22/2018    Steven Lopez    MRN: [id number]           Patient Information     Date Of Birth          1945        Visit Information        Provider Department      10/22/2018 10:00 AM Noel Ballesteros MD [facility]        Today's Diagnoses     Prostate cancer (H)    -  1    Special screening for malignant neoplasm of prostate           Follow-ups after your visit        Your next 10 appointments already scheduled     Apr 23, 2019 11:00 AM CDT   Follow Up with Noel Ballesteros MD   [facility] ([facility] )    [address] [place] [address] [phone]              Who to contact     If you have questions or need follow up information about today's clinic visit or your schedule please contact [facility] directly at [phone].  Normal or non-critical lab and imaging results will be communicated to you by MyChart, letter or phone within 4 business days after the clinic has received the results. If you do not hear from us within 7 days, please contact the clinic through MyChart or phone. If you have a critical or abnormal lab result, we will notify you by phone as soon as possible.  Submit refill requests through [employer] or call your pharmacy and they will forward the refill request to us. Please allow 3 business days for your refill to be completed.          Additional Information About Your Visit        BOOK [employer] TIGERharXunLight Information     [employer] lets you send messages to your doctor, view your test results, renew your prescriptions, schedule appointments and more. To sign up, go to [email or web address] . Click on \"Log in\" on the left side of the screen, which will take you to the Welcome page. Then click on \"Sign up Now\" on the right side of the page.     You will be asked to enter the access code listed below, as well as some personal information. Please follow the directions to create your username and password.   "   Your access code is: [email or web address]  Expires: 2019  7:51 AM     Your access code will  in 90 days. If you need help or a new code, please call your [facility] or [phone].        Care EveryWhere ID     This is your Care EveryWhere ID. This could be used by other organizations to access your [place] medical records  [id number]        Your Vitals Were     Pulse Respirations BMI (Body Mass Index)             56 16 31.63 kg/m2          Blood Pressure from Last 3 Encounters:   10/22/18 (!) 162/94   18 160/88   18 122/78    Weight from Last 3 Encounters:   10/22/18 94.3 kg (208 lb)   18 92.1 kg (203 lb)   18 92.5 kg (204 lb)              We Performed the Following     Prostate spec antigen screen        Primary Care Provider Office Phone # Fax #    Sabina KENDAL Flanagan [phone]       [place] [address] [address] [facility] [address]        Equal Access to Services     [facility]: Hadii aad ku hadasho Soomaali, waaxda luqadaha, qaybta kaalmada adeegyaabel, steven victoria . So [facility] [phone].    ATENCIÓN: Si habla español, tiene a bergman disposición servicios gratuitos de asistencia lingüística. Llame al [phone].    We comply with applicable federal civil rights laws and [place] laws. We do not discriminate on the basis of race, color, national origin, age, disability, sex, sexual orientation, or gender identity.            Thank you!     Thank you for choosing [facility]  for your care. Our goal is always to provide you with excellent care. Hearing back from our patients is one way we can continue to improve our services. Please take a few minutes to complete the written survey that you may receive in the mail after your visit with us. Thank you!             Your Updated Medication List - Protect others around you: Learn how to safely use, store and throw away your medicines at www.disposemymeds.org.           This list is accurate as of 10/22/18 11:59 PM.  Always use your most recent med list.                   Brand Name Dispense Instructions for use Diagnosis    aspirin 325 MG tablet      Take 325 mg by mouth daily        atorvastatin 20 MG tablet    LIPITOR     Take 20 mg by mouth daily        fish Oil 1200 MG capsule      Take 1,200 mg by mouth daily        glucosamine chondroitin 1500 complex Caps           metoprolol succinate 25 MG 24 hr tablet    TOPROL-XL     Take 25 mg by mouth daily        VIAGRA 100 MG tablet   Generic drug:  sildenafil      TAKE ONE-HALF TABLET BY MOUTH AS NEEDED FOR ERECTILE DYSFUNCTION.

## 2018-10-22 NOTE — PROGRESS NOTES
FOLLOW-UP VISIT    Patient Name: Steven Lopez      : 1945     Age: 73 year old        ______________________________________________________________________________     Chief Complaint   Patient presents with     Radiation Therapy     BP (!) 162/94 (BP Location: Right arm, Patient Position: Chair, Cuff Size: Adult Regular)  Pulse 56  Resp 16  Wt 94.3 kg (208 lb)  BMI 31.63 kg/m2     Date Radiation Completed: 18    Pain  Denies    Labs  Other Labs: N/A    Imaging  None        PSA:   PSA   Date Value Ref Range Status   10/22/2018 1.81 0 - 4 ug/L Final     Comment:     Assay Method:  Chemiluminescence using Siemens Vista analyzer     Testosterone Level: No results found for: TESTOSTTOTAL    On-Study AUA Symptom Score (PQ)       Diarrhea: 0- None    Constipation: 0- None    Nocturia: 0-none    Dysuria: 0-none    Hematuria: 0-none    Pt is here for a routine follow up after completion of EBRT for prostate cancer.  PSA was drawn in the room by the lab staff. Results noted above. Pt has no other concerns or complaints.   Joana Cao RN

## 2019-05-07 ENCOUNTER — ONCOLOGY VISIT (OUTPATIENT)
Dept: RADIATION ONCOLOGY | Facility: HOSPITAL | Age: 74
End: 2019-05-07
Attending: RADIOLOGY
Payer: MEDICARE

## 2019-05-07 VITALS
HEART RATE: 60 BPM | BODY MASS INDEX: 31.17 KG/M2 | RESPIRATION RATE: 16 BRPM | SYSTOLIC BLOOD PRESSURE: 136 MMHG | DIASTOLIC BLOOD PRESSURE: 72 MMHG | WEIGHT: 205 LBS

## 2019-05-07 DIAGNOSIS — Z12.5 SPECIAL SCREENING FOR MALIGNANT NEOPLASM OF PROSTATE: ICD-10-CM

## 2019-05-07 DIAGNOSIS — C61 PROSTATE CANCER (H): Primary | ICD-10-CM

## 2019-05-07 LAB — PSA SERPL-ACNC: 0.67 UG/L (ref 0–4)

## 2019-05-07 PROCEDURE — G0103 PSA SCREENING: HCPCS | Performed by: RADIOLOGY

## 2019-05-07 PROCEDURE — G0463 HOSPITAL OUTPT CLINIC VISIT: HCPCS | Performed by: RADIOLOGY

## 2019-05-07 PROCEDURE — 84153 ASSAY OF PSA TOTAL: CPT | Performed by: RADIOLOGY

## 2019-05-07 PROCEDURE — 36415 COLL VENOUS BLD VENIPUNCTURE: CPT | Performed by: RADIOLOGY

## 2019-05-07 ASSESSMENT — PAIN SCALES - GENERAL: PAINLEVEL: NO PAIN (0)

## 2019-05-07 NOTE — PROGRESS NOTES
FOLLOW-UP VISIT    Patient Name: Steven Lopez      : 1945     Age: 74 year old        ______________________________________________________________________  Chief Complaint   Patient presents with     Radiation Therapy     /72 (BP Location: Left arm, Patient Position: Chair, Cuff Size: Adult Regular)   Pulse 60   Resp 16   Wt 93 kg (205 lb)   BMI 31.17 kg/m       Date Radiation Completed: 18    Pain  Denies    Labs  Other Labs: N/A    Imaging  None      PSA:   PSA   Date Value Ref Range Status   2019 0.67 0 - 4 ug/L Final     Comment:     Assay Method:  Chemiluminescence using Siemens Vista analyzer   10/22/2018 1.81 0 - 4 ug/L Final     Comment:     Assay Method:  Chemiluminescence using Siemens Vista analyzer     Testosterone Level: No results found for: TESTOSTTOTAL    On-Study AUA Symptom Score (PQ)       Diarrhea: 0- None    Constipation: 0- None    Nocturia :0 - None    Dysuria:1 - Painful urination requiring no medications  occasionally    Hematuria:0 - No    Nurse face-to-face time: Level 3:  10 min face to face time    Pt is here for a routine follow up after completion of EBRT for prostate cancer.  PSA was drawn in the room by the lab staff. Results noted above. Pt has no other concerns or complaints.   Joana Cao RN

## 2019-05-07 NOTE — PROGRESS NOTES
DATE OF SERVICE: 5/7/2019    RADIATION THERAPY FOLLOW UP      REFERRING PROVIDERS:  Jaiden Almodovar MD; Daniel Matthews DO; Sabina Flanagan DNP.      DIAGNOSIS:  Prostate carcinoma, Dorys 3+3 with marked acceleration of PSA 9.95 prior to treatment.      RADIATION RX:  The patient completed radiation therapy, 7790 cGy on 07/06/2018.        SUBJECTIVE: The patient reports that he is doing well.  He generally does not have nocturia.  He reports he may have it once per week.  He has a steady stream but it is less than when he was younger.  He denies urgency, incontinence and frequency.  He has a formed bowel movement twice per day.  Denies bone pain.  He has slightly decreased energy.     OBJECTIVE:   VITAL SIGNS:  Weight 205 pounds.  Blood pressure 136/72, heart rate 60.   HEENT:  Extraocular muscle movements, full.  Pupils:  Equal, round, reactive.  Face is symmetric.  Palate and tongue, midline and symmetric.   NECK:  No cervical or supraclavicular lymphadenopathy.   LUNGS:  Clear to auscultation.   HEART:  Cardiac exam reveals regular rate and rhythm without murmurs or extra sounds.   ABDOMEN:  Nontender, without appreciable organomegaly.  No inguinal lymphadenopathy.   EXTREMITIES:  Extremity strength is intact.  No lower extremity edema  RECTAL: Smooth normal sized prostate without nodule.  Rectal tone normal limits.    LABORATORY DATA:  Today's PSA returned at 0.67, down from 1.81 in October 2018.     IMPRESSION: Steven is doing well following definitive management for intermediate risk prostate carcinoma.  There is no evidence of disease recurrence at this time.  His PSA level continues to decrease.  He has few symptoms if any from his course of radiation.     PLAN:  Continue six-month followup.

## 2019-11-07 ENCOUNTER — ONCOLOGY VISIT (OUTPATIENT)
Dept: RADIATION ONCOLOGY | Facility: HOSPITAL | Age: 74
End: 2019-11-07
Attending: RADIOLOGY
Payer: MEDICARE

## 2019-11-07 VITALS
RESPIRATION RATE: 16 BRPM | BODY MASS INDEX: 31.26 KG/M2 | DIASTOLIC BLOOD PRESSURE: 86 MMHG | SYSTOLIC BLOOD PRESSURE: 168 MMHG | WEIGHT: 205.6 LBS | HEART RATE: 56 BPM

## 2019-11-07 DIAGNOSIS — Z12.5 SPECIAL SCREENING FOR MALIGNANT NEOPLASM OF PROSTATE: Primary | ICD-10-CM

## 2019-11-07 DIAGNOSIS — Z12.5 SPECIAL SCREENING FOR MALIGNANT NEOPLASM OF PROSTATE: ICD-10-CM

## 2019-11-07 DIAGNOSIS — R31.9 HEMATURIA, UNSPECIFIED TYPE: ICD-10-CM

## 2019-11-07 DIAGNOSIS — C61 PROSTATE CANCER (H): Primary | ICD-10-CM

## 2019-11-07 LAB
ALBUMIN UR-MCNC: NEGATIVE MG/DL
APPEARANCE UR: CLEAR
BACTERIA #/AREA URNS HPF: ABNORMAL /HPF
BILIRUB UR QL STRIP: NEGATIVE
COLOR UR AUTO: ABNORMAL
GLUCOSE UR STRIP-MCNC: NEGATIVE MG/DL
HGB UR QL STRIP: NEGATIVE
KETONES UR STRIP-MCNC: NEGATIVE MG/DL
LEUKOCYTE ESTERASE UR QL STRIP: NEGATIVE
MUCOUS THREADS #/AREA URNS LPF: PRESENT /LPF
NITRATE UR QL: NEGATIVE
PH UR STRIP: 6 PH (ref 4.7–8)
PSA SERPL-ACNC: 0.4 UG/L (ref 0–4)
RBC #/AREA URNS AUTO: 1 /HPF (ref 0–2)
SOURCE: ABNORMAL
SP GR UR STRIP: 1.02 (ref 1–1.03)
UROBILINOGEN UR STRIP-MCNC: NORMAL MG/DL (ref 0–2)
WBC #/AREA URNS AUTO: 2 /HPF (ref 0–5)

## 2019-11-07 PROCEDURE — 81001 URINALYSIS AUTO W/SCOPE: CPT | Performed by: RADIOLOGY

## 2019-11-07 PROCEDURE — G0103 PSA SCREENING: HCPCS | Performed by: RADIOLOGY

## 2019-11-07 PROCEDURE — 36415 COLL VENOUS BLD VENIPUNCTURE: CPT | Performed by: RADIOLOGY

## 2019-11-07 PROCEDURE — G0463 HOSPITAL OUTPT CLINIC VISIT: HCPCS | Performed by: RADIOLOGY

## 2019-11-07 PROCEDURE — 84153 ASSAY OF PSA TOTAL: CPT | Performed by: RADIOLOGY

## 2019-11-07 PROCEDURE — 99212 OFFICE O/P EST SF 10 MIN: CPT | Performed by: RADIOLOGY

## 2019-11-07 ASSESSMENT — PAIN SCALES - GENERAL: PAINLEVEL: NO PAIN (0)

## 2019-11-07 NOTE — PROGRESS NOTES
Date of Service (when I saw the patient): 11/07/2019     RADIATION THERAPY FOLLOW UP      REFERRING PROVIDERS:  Jaiden Almodovar MD; Daniel Matthews DO; Sabina Flanagan DNP.      DIAGNOSIS:  Prostate carcinoma, [place] 3+3 with marked acceleration of PSA 9.95 prior to treatment.      RADIATION RX:  The patient completed radiation therapy, 7790 cGy on 07/06/2018, 16 months ago.        SUBJECTIVE: The patient reports that he is doing well.  He generally does not have nocturia.  He reports he may have it once or twice per week.  He has a steady stream but it is less than when he was younger.  He denies urgency, incontinence and frequency.  He has a formed bowel movement twice per day.  Denies bone pain.  He has slightly decreased energy.     OBJECTIVE:   VITAL SIGNS:  Weight 205.9 pounds.  Blood pressure is elevated at 168/86, heart rate 50, respirations 16, 0 out of 10 pain.   HEENT:  Extraocular muscle movements.  Pupils:  Equal, round, reactive.  Face is symmetric.  Palate and tongue, midline and symmetric.   NECK:  No cervical or supraclavicular lymphadenopathy.   LUNGS:  Clear to auscultation.   HEART:  Cardiac exam reveals regular rate and rhythm without murmurs or extra sounds.   ABDOMEN:  Nontender, without appreciable organomegaly.  No inguinal lymphadenopathy.   EXTREMITIES:  Extremity strength is intact.  No lower extremity edema  RECTAL: Smooth normal sized prostate without nodule.  Rectal tone normal limits.     LABORATORY DATA:  Today's PSA returned at 0.40 down from 0.67 in May 2019.  Urine analysis was negative for hematuria and bacteria although mucus was present in the sample.    MEDICATIONS: 325 mg aspirin, atorvastatin 20 mg, metoprolol, omega-3 fatty acids,     IMPRESSION: Steven is doing well following definitive management for intermediate risk prostate carcinoma.  There is no evidence of disease recurrence at this time.  His PSA level continues to decrease.  He has few symptoms if any  from his course of radiation.     PLAN:  Continue six-month followup.        Joanna Alcocer MD

## 2020-10-26 ENCOUNTER — ONCOLOGY VISIT (OUTPATIENT)
Dept: RADIATION ONCOLOGY | Facility: HOSPITAL | Age: 75
End: 2020-10-26
Attending: RADIOLOGY
Payer: MEDICARE

## 2020-10-26 VITALS
WEIGHT: 205 LBS | BODY MASS INDEX: 31.17 KG/M2 | HEART RATE: 56 BPM | RESPIRATION RATE: 16 BRPM | SYSTOLIC BLOOD PRESSURE: 120 MMHG | DIASTOLIC BLOOD PRESSURE: 82 MMHG

## 2020-10-26 DIAGNOSIS — Z12.5 SPECIAL SCREENING FOR MALIGNANT NEOPLASM OF PROSTATE: ICD-10-CM

## 2020-10-26 DIAGNOSIS — C61 PROSTATE CANCER (H): Primary | ICD-10-CM

## 2020-10-26 LAB — PSA SERPL-ACNC: 0.21 UG/L (ref 0–4)

## 2020-10-26 PROCEDURE — 99212 OFFICE O/P EST SF 10 MIN: CPT | Performed by: RADIOLOGY

## 2020-10-26 PROCEDURE — G0463 HOSPITAL OUTPT CLINIC VISIT: HCPCS | Performed by: RADIOLOGY

## 2020-10-26 PROCEDURE — 84153 ASSAY OF PSA TOTAL: CPT | Performed by: RADIOLOGY

## 2020-10-26 PROCEDURE — G0103 PSA SCREENING: HCPCS | Performed by: RADIOLOGY

## 2020-10-26 RX ORDER — ATORVASTATIN CALCIUM 10 MG/1
10 TABLET, FILM COATED ORAL
COMMUNITY
Start: 2020-09-28

## 2020-10-26 RX ORDER — HYDROCORTISONE ACETATE 0.5 %
CREAM (GRAM) TOPICAL
COMMUNITY
End: 2021-10-25

## 2020-10-26 ASSESSMENT — PAIN SCALES - GENERAL: PAINLEVEL: NO PAIN (0)

## 2020-10-26 NOTE — PROGRESS NOTES
FOLLOW-UP VISIT    Patient Name: Steven Lopez      : 1945     Age: 75 year old        ______________________________________________________________________  Chief Complaint   Patient presents with     Radiation Therapy     /82 (BP Location: Left arm, Patient Position: Sitting, Cuff Size: Adult Regular)   Pulse 56   Resp 16   Wt 93 kg (205 lb)   BMI 31.17 kg/m       Date Radiation Completed: 18    Pain  Denies    Labs  Other Labs: N/A    Imaging  None      PSA:   PSA   Date Value Ref Range Status   2019 0.40 0 - 4 ug/L Final     Comment:     Assay Method:  Chemiluminescence using Siemens Vista analyzer   2019 0.67 0 - 4 ug/L Final     Comment:     Assay Method:  Chemiluminescence using Siemens Vista analyzer   10/22/2018 1.81 0 - 4 ug/L Final     Comment:     Assay Method:  Chemiluminescence using Siemens Vista analyzer     Testosterone Level: No results found for: TESTOSTTOTAL    On-Study AUA Symptom Score (PQ)    AUA Score: 4     Diarrhea: 0- None    Constipation: 0- None    Nocturia :1 - 1-2 Times/Night    Dysuria:1 - Painful urination requiring no medications    Hematuria:1 - Yes    Nurse face-to-face time: Level 3:  10 min face to face time    Joana Cao RN

## 2020-10-26 NOTE — PROGRESS NOTES
Date of service: 10/6/2020    RADIATION THERAPY FOLLOW UP      REFERRING PROVIDERS:  Jaiden Almodovar MD; Daniel Matthews DO; Sabina Flanagan DNP.      DIAGNOSIS:  Prostate carcinoma, Dorys 3+3 with marked acceleration of PSA 9.95 prior to treatment.      RADIATION RX:  The patient completed radiation therapy, 7790 cGy on 07/06/2018, 2 years and 3 months ago.        SUBJECTIVE: Patient reports he has been doing well since he last saw me 6 months ago.  Following the physical examination which included rectal examination of the prostate bed, the patient experienced jimmy blood in his urine.  He notified us of this at that time and it resolved without intervention.  The patient also notes that he has mild urgency of bowel and bladder since prostate cancer treatment.  The AUA symptom score is 4 with less than half the time over the past month he has a weak urine stream.  OBJECTIVE:   VITAL SIGNS: Weight 205 pounds, blood pressure 120/82, pulse 56, respirations 16.  Pain 0 on a 10 point scale.    HEENT:  Extraocular muscle movements.  Pupils:  Equal, round, reactive.  Face is symmetric.  Palate and tongue, midline and symmetric.   NECK:  No cervical or supraclavicular lymphadenopathy.   LUNGS:  Clear to auscultation.   HEART:  Cardiac exam reveals regular rate and rhythm without murmurs or extra sounds.   ABDOMEN:  Nontender, without appreciable organomegaly.  No inguinal lymphadenopathy.   EXTREMITIES:  Extremity strength is intact.  No lower extremity edema  RECTAL: No examination performed today, as patient's preference after bleeding 6 months ago.     LABORATORY DATA:  Today's PSA returned at 0.21 down from 0.40 in November 2019.       MEDICATIONS: 325 mg aspirin, atorvastatin 20 mg, metoprolol, omega-3 fatty acids, glucosamine-chondroid-vitamin C, sildenafil.     IMPRESSION: Steven is doing well following definitive management for intermediate risk prostate carcinoma.  There is no evidence of disease  recurrence at this time.  His PSA level continues to decrease.  He has few symptoms if any from his course of radiation.  He is no longer followed by urology but he is seen regularly by his certified nurse practitioner.     PLAN:  Continue regular followup.   Return to our department in 1 year.        Joanna Alcocer MD

## 2021-10-25 ENCOUNTER — ONCOLOGY VISIT (OUTPATIENT)
Dept: RADIATION ONCOLOGY | Facility: HOSPITAL | Age: 76
End: 2021-10-25
Attending: RADIOLOGY
Payer: MEDICARE

## 2021-10-25 VITALS — RESPIRATION RATE: 16 BRPM | HEIGHT: 68 IN | BODY MASS INDEX: 30.51 KG/M2 | WEIGHT: 201.3 LBS

## 2021-10-25 DIAGNOSIS — C61 PROSTATE CANCER (H): Primary | ICD-10-CM

## 2021-10-25 PROCEDURE — G0463 HOSPITAL OUTPT CLINIC VISIT: HCPCS | Performed by: RADIOLOGY

## 2021-10-25 PROCEDURE — 99213 OFFICE O/P EST LOW 20 MIN: CPT | Performed by: RADIOLOGY

## 2021-10-25 ASSESSMENT — MIFFLIN-ST. JEOR: SCORE: 1607.59

## 2021-10-25 ASSESSMENT — PAIN SCALES - GENERAL: PAINLEVEL: NO PAIN (0)

## 2021-10-25 NOTE — NURSING NOTE
"Oncology Rooming Note    2021 10:52 AM   Steven Lopez is a 76 year old male who presents for:    Chief Complaint   Patient presents with     Prostate Cancer     Initial Vitals: Resp 16   Ht 1.727 m (5' 8\")   Wt 91.3 kg (201 lb 4.8 oz)   BMI 30.61 kg/m   Estimated body mass index is 30.61 kg/m  as calculated from the following:    Height as of this encounter: 1.727 m (5' 8\").    Weight as of this encounter: 91.3 kg (201 lb 4.8 oz). Body surface area is 2.09 meters squared.  No Pain (0) Comment: Data Unavailable   No LMP for male patient.  Allergies reviewed: Yes  Medications reviewed: Yes    Medications: Medication refills not needed today.  Pharmacy name entered into [employer] System: [facility] [place] [address] [address]    Clinical concerns: ROS (score of 0 indicates that symptom is at baseline for most sections below): See Flowsheet for additional comments as indicated.  No Pain (0)  Nutrition Alteration  Diet Type: Patient's Preference  Anorexia NCI: 0 - None  Skin  Skin Reaction: 0 - No changes           Gastrointestinal  Nausea: 0 - None  Vomitin - No vomiting (ons)  Diarrhea: 0 - None  Weight loss:  (no wt loss)  Genitourinary  Dysuria: 1 - Painful urination requiring no medications (blood occassionally in the urine)   Note:  (urinates 3-4 times a day, no night time urination. )  Psychosocial  Mood - Anxiety: 0 - Normal  Pain Assessment  0-10 Pain Scale: 0     AUA Score: 1       Joanne Peter RN            "

## 2021-10-25 NOTE — PROGRESS NOTES
RADIATION THERAPY FOLLOW UP      REFERRING PROVIDERS:  Jaiden Almodovar MD; Daniel Matthews DO; Sabina Flanagan DNP.      DIAGNOSIS:  Prostate carcinoma, Dorys 3+3 with marked acceleration of PSA 9.95 prior to treatment.      RADIATION RX:  The patient completed radiation therapy, 7790 cGy on 07/06/2018, 3 years and 3 months ago.        SUBJECTIVE: Patient reports he has been doing well since he last saw me 6 months ago.  [place].  He notes occasional spots of blood in the urine.  He denies urgency.  On rare occasions he experiences burning with urination.  The AUA symptom score is 1 with minimal symptoms or complaints.    OBJECTIVE:   VITAL SIGNS: Weight 201 pounds. AUA score 1(see flow sheet)   EENT:  Extraocular muscle movements.  Pupils:  Equal, round, reactive.  Face is symmetric.  Palate and tongue, midline and symmetric.   NECK:  No cervical or supraclavicular lymphadenopathy.   LUNGS:  Clear to auscultation.   HEART:  Cardiac exam reveals regular rate and rhythm without murmurs or extra sounds.   ABDOMEN:  Nontender, without appreciable organomegaly.  No inguinal lymphadenopathy.   EXTREMITIES:  Extremity strength is intact.  No lower extremity edema  RECTAL: No examination performed today, as patient's preference after bleeding 6 months ago.     LABORATORY DATA:  Today's PSA returned at 0.1 down from 0.21 in November 2021.       MEDICATIONS: 325 mg aspirin, atorvastatin 20 mg, metoprolol, omega-3 fatty acids, sildenafil.     IMPRESSION: Steven is doing well following definitive management for intermediate risk prostate carcinoma.  There is no evidence of disease recurrence at this time.  His PSA level continues to decrease and is 0.1 today.  He has few symptoms if any from his course of radiation.   He is sexually active. He is no longer followed by urology but he is seen regularly by his certified nurse practitioner.    PLAN: The patient continues to do well and there is  no evidence of disease at this time.  The patient sees his nurse practitioner regularly and he would like to continue his prostate follow-up with her.   It has been 3 years since the prostate treatment and follow-up can  be performed during his regular checkups.  I recommend regular PSA checks and digital rectal examination.      Joanna Alcocer MD  Radiation oncology